# Patient Record
Sex: FEMALE | Race: WHITE | HISPANIC OR LATINO
[De-identification: names, ages, dates, MRNs, and addresses within clinical notes are randomized per-mention and may not be internally consistent; named-entity substitution may affect disease eponyms.]

---

## 2020-08-24 PROBLEM — Z00.00 ENCOUNTER FOR PREVENTIVE HEALTH EXAMINATION: Status: ACTIVE | Noted: 2020-08-24

## 2020-09-23 ENCOUNTER — APPOINTMENT (OUTPATIENT)
Dept: ENDOCRINOLOGY | Facility: CLINIC | Age: 60
End: 2020-09-23
Payer: MEDICAID

## 2020-09-23 VITALS
DIASTOLIC BLOOD PRESSURE: 81 MMHG | HEART RATE: 82 BPM | HEIGHT: 61 IN | WEIGHT: 141 LBS | BODY MASS INDEX: 26.62 KG/M2 | SYSTOLIC BLOOD PRESSURE: 142 MMHG

## 2020-09-23 PROCEDURE — 97802 MEDICAL NUTRITION INDIV IN: CPT

## 2020-09-23 PROCEDURE — 82962 GLUCOSE BLOOD TEST: CPT

## 2020-09-23 PROCEDURE — 83036 HEMOGLOBIN GLYCOSYLATED A1C: CPT | Mod: QW

## 2020-09-23 PROCEDURE — 99205 OFFICE O/P NEW HI 60 MIN: CPT | Mod: 25

## 2020-09-25 ENCOUNTER — APPOINTMENT (OUTPATIENT)
Dept: ENDOCRINOLOGY | Facility: CLINIC | Age: 60
End: 2020-09-25
Payer: MEDICAID

## 2020-09-25 PROCEDURE — 99213 OFFICE O/P EST LOW 20 MIN: CPT | Mod: 95

## 2020-09-25 NOTE — REVIEW OF SYSTEMS
[Blurred Vision] : blurred vision [As Noted in HPI] : as noted in HPI [Joint Pain] : joint pain [Anxiety] : anxiety [Negative] : Heme/Lymph [Polyuria] : no polyuria [Nocturia] : no nocturia [de-identified] : no pins and needles sensation in lower extremities

## 2020-09-25 NOTE — HISTORY OF PRESENT ILLNESS
[FreeTextEntry1] : 59 y/o F pt, with Hx of DM (dx 18 yrs ago at age 42) with no known DM related complications, referred by Dr. Win Dahl, presents today to establish endocrine care with me.\par Other PMHx: RA (has consultation appointment with rheumatologist)\par PSHx:  (~25 yrs ago), Cholecystectomy (~15 yrs ago), Uterus (~10 yrs ago)\par FHx: Thyroid disorder (3 brothers), DM (2 sisters)\par SHx: Non smoker. No EtOH use. \par Lifestyle: Walks QD. Eats 2 meals a day. Checks FBS QD. \par Last Funduscopic visit: 2019\par Menopause in . Pt has one son of age 25. \par NKDA\par \par 2020\par Pt presents today with POCT 309 and POCT A1c 8.2%.   She has been feeling anxious and tends to eat as she likes lately. She checks FBS daily and brought her BS record to the office today.  FBS in late  ranges between 15- 140 with  average in 120. FBS in 2020: , 121, 166, 162, 167, 183, 161, 172, 225. Pt reports experiencing joint discomfort and pain, blurry vision and feeling "unhealthy" when her BS rises. \par Denies osmotic diuresis symptoms and pins and needles sensation in lower extremities. \par \par Current Medications: Metformin 1000 mg BID, Glipizide 10 mg QD, Alogliptin 25 mg QD, Simvastatin 40 mg QD, Fenofibrate 50 mg \par \par Labs: 20: POCT A1c 8.2%

## 2020-09-25 NOTE — END OF VISIT
[FreeTextEntry3] : All medical record entries made by the Scribe were at my, Dr. Chaitanya Garza, direction and personally dictated by me on 09/23/2020. I have reviewed the chart and agree that the record accurately reflects my personal performance of the history, physical exam, assessment and plan. I have also personally directed, reviewed and agreed with the chart.  [Time Spent: ___ minutes] : I have spent [unfilled] minutes of time on the encounter. [>50% of the face to face encounter time was spent on counseling and/or coordination of care for ___] : Greater than 50% of the face to face encounter time was spent on counseling and/or coordination of care for [unfilled]

## 2020-09-25 NOTE — PHYSICAL EXAM
[Alert] : alert [Normal Sclera/Conjunctiva] : normal sclera/conjunctiva [Normal Outer Ear/Nose] : the ears and nose were normal in appearance [No Neck Mass] : no neck mass was observed [No Respiratory Distress] : no respiratory distress [Clear to Auscultation] : lungs were clear to auscultation bilaterally [Normal Rate] : heart rate was normal [Regular Rhythm] : with a regular rhythm [No Edema] : no peripheral edema [Pedal Pulses Normal] : the pedal pulses are present [Normal Bowel Sounds] : normal bowel sounds [Spine Straight] : spine straight [No Stigmata of Cushings Syndrome] : no stigmata of Cushings Syndrome [Normal Gait] : normal gait [Right Foot Was Examined] : right foot ~C was examined [Left Foot Was Examined] : left foot ~C was examined [Normal] : normal [Normal Reflexes] : deep tendon reflexes were 2+ and symmetric [Oriented x3] : oriented to person, place, and time [Acanthosis Nigricans] : no acanthosis nigricans [Vibration Dec.] : normal vibratory sensation at the level of the toes [Diminished Throughout Both Feet] : normal tactile sensation with monofilament testing throughout both feet

## 2020-09-25 NOTE — HISTORY OF PRESENT ILLNESS
[Home] : at home, [unfilled] , at the time of the visit. [Other Location: e.g. Home (Enter Location, City,State)___] : at [unfilled] [Verbal consent obtained from patient] : the patient, [unfilled] [FreeTextEntry1] : 59 y/o F pt, with Hx of DM (dx 18 yrs ago at age 42) with no known DM related complications, referred by Dr. Win Dahl, presents today to establish endocrine care with me.\par Other PMHx: RA (has consultation appointment with rheumatologist)\par PSHx:  (~25 yrs ago), Cholecystectomy (~15 yrs ago), Uterus (~10 yrs ago)\par FHx: Thyroid disorder (3 brothers), DM (2 sisters)\par SHx: Non smoker. No EtOH use. \par Lifestyle: Walks QD. Eats 2 meals a day. Checks FBS QD. \par Last Funduscopic visit: 2019\par Menopause in . Pt has one son of age 25. \par NKDA\par \par 2020\par Pt presents today with POCT 309 and POCT A1c 8.2%.   She has been feeling anxious and tends to eat as she likes lately. She checks FBS daily and brought her BS record to the office today.  FBS in late  ranges between 15- 140 with  average in 120. FBS in 2020: , 121, 166, 162, 167, 183, 161, 172, 225. Pt reports experiencing joint discomfort and pain, blurry vision and feeling "unhealthy" when her BS rises. \par Denies osmotic diuresis symptoms and pins and needles sensation in lower extremities. \par \par 20. Televideo, pat did not have any question prior to this visit\par Seen neurologist for extremities and upper back pain she was Rx Lyrica but has not start yet\par Saw RD, and begun changing her diet. Feels better\par ,and 140. Post prandial 215\par Labs pending\par Current Medications: Metformin 1000 mg BID, Glipizide 10 mg QD, Alogliptin 25 mg QD, Simvastatin 40 mg QD, Fenofibrate 50 mg \par \par Labs: 20: POCT A1c 8.2%

## 2020-09-25 NOTE — ASSESSMENT
[Carbohydrate Consistent Diet] : carbohydrate consistent diet [Importance of Diet and Exercise] : importance of diet and exercise to improve glycemic control, achieve weight loss and improve cardiovascular health [Exercise/Effect on Glucose] : exercise/effect on glucose [Self Monitoring of Blood Glucose] : self monitoring of blood glucose [FreeTextEntry1] : 61 y/o M/F pt with:\par \par 1. Hx of T2DM with no known DM related complications. \par High glucose excursion in the 300's\par Diabetes treatment goals discussed, including target goals for BP, LDL-c, A1c, and body weight. \par Discussed the importance of BS monitoring, and advised pt to monitor pre and post prandial BS.  \par Briefly summarized anti-diabetic medications, including benefits and side effects with pt. \par Recommend pt continue present DM management until the assessment of current evaluation is completed. \par Refer pt to see nurse educator for comprehensive DM teachings, including sick days management. \par Will order labs today, including metabolic and lipid profiles. \par \par Return in: 9/25/20 via telehealth

## 2020-09-25 NOTE — ASSESSMENT
[FreeTextEntry1] : 61 y/o M/F pt with:\par \par 1. Hx of T2DM  diagnosed at age 42 with no known DM related complications. \par High glucose excursion in the 300's, after seen RD postprandial are trending down\par Awaiting for lab report\par Continue DPP IV inh, S.U,and metformin, will review along with labs  report prior to drug treatment recommendation\par \par Return in 8 weeks\par \par \par  [Carbohydrate Consistent Diet] : carbohydrate consistent diet [Importance of Diet and Exercise] : importance of diet and exercise to improve glycemic control, achieve weight loss and improve cardiovascular health [Exercise/Effect on Glucose] : exercise/effect on glucose [Self Monitoring of Blood Glucose] : self monitoring of blood glucose

## 2020-09-25 NOTE — REASON FOR VISIT
[Initial Evaluation] : an initial evaluation [DM Type 2] : DM Type 2 [FreeTextEntry2] : Dr. Win Dahl

## 2020-09-25 NOTE — ADDENDUM
[FreeTextEntry1] : I, Raji Crisostomo, acted solely as a scribe for Dr. Chaitanya Garza on this date. 09/23/2020.

## 2020-09-28 ENCOUNTER — APPOINTMENT (OUTPATIENT)
Dept: ENDOCRINOLOGY | Facility: CLINIC | Age: 60
End: 2020-09-28

## 2020-09-28 LAB
GLUCOSE BLDC GLUCOMTR-MCNC: 309
HBA1C MFR BLD HPLC: 8.2

## 2020-12-03 ENCOUNTER — APPOINTMENT (OUTPATIENT)
Dept: ENDOCRINOLOGY | Facility: CLINIC | Age: 60
End: 2020-12-03
Payer: MEDICAID

## 2020-12-03 ENCOUNTER — APPOINTMENT (OUTPATIENT)
Dept: ENDOCRINOLOGY | Facility: CLINIC | Age: 60
End: 2020-12-03

## 2020-12-03 VITALS
HEART RATE: 71 BPM | WEIGHT: 136 LBS | DIASTOLIC BLOOD PRESSURE: 79 MMHG | BODY MASS INDEX: 25.7 KG/M2 | SYSTOLIC BLOOD PRESSURE: 124 MMHG

## 2020-12-03 PROCEDURE — 99072 ADDL SUPL MATRL&STAF TM PHE: CPT

## 2020-12-03 PROCEDURE — 99214 OFFICE O/P EST MOD 30 MIN: CPT

## 2020-12-04 NOTE — END OF VISIT
[FreeTextEntry3] : All medical record entries made by the Scribe were at my, Dr. Chaitanya Garza, direction and personally dictated by me on 12/03/2020. I have reviewed the chart and agree that the record accurately reflects my personal performance of the history, physical exam, assessment and plan. I have also personally directed, reviewed and agreed with the chart.  [Time Spent: ___ minutes] : I have spent [unfilled] minutes of time on the encounter. [>50% of the face to face encounter time was spent on counseling and/or coordination of care for ___] : Greater than 50% of the face to face encounter time was spent on counseling and/or coordination of care for [unfilled]

## 2020-12-04 NOTE — ADDENDUM
[FreeTextEntry1] : I, Raji Crisostomo, acted solely as a scribe for Dr. Chaitanya Garza on this date. 12/03/2020.

## 2020-12-04 NOTE — HISTORY OF PRESENT ILLNESS
[Home] : at home, [unfilled] , at the time of the visit. [Medical Office: ([facility])___] : at the medical office located in  [Verbal consent obtained from patient] : the patient, [unfilled] [FreeTextEntry1] : 61 y/o F pt, with Hx of DM (dx at age 42) with no known DM related complications. \par Other PMHx: RA (has consultation appointment with rheumatologist)\par PSHx:  (~25 yrs ago), Cholecystectomy (~15 yrs ago), Uterus (~10 yrs ago)\par FHx: Thyroid disorder (3 brothers), DM (2 sisters)\par Lifestyle: Walks QD. Eats 2 meals a day. Checks FBS QD. \par Last Funduscopic visit: 2019\par Menopause in . Pt has one son of age 25. \par \par 2020 \par Pt presents today for DM f/u, feeling well with no acute complaints. She has lost 5 lbs in last 3 months. Pt brought her BS record. FBS in 10/2020: 156, 150, 140. She is trying to get better with her diet and lifestyle. \par \par Current Medications: Metformin 1000 mg BID, Glipizide 10 mg QD, Alogliptin 25 mg QD, Pioglitazone, Simvastatin 40 mg QD, Fenofibrate 50 mg \par \par Labs: \par - 20: Glucose 308, s.creat 0.9, ALT 49, LDL-c 90, , Alb/Creat nml, TSH 4.41, \par - 20: POCT A1c 8.2%

## 2020-12-04 NOTE — ASSESSMENT
[FreeTextEntry1] : 61 y/o M/F pt with:\par \par 1. Hx of T2DM (dx at age 42) with no known DM related complications: \par She is currently on 4 medication for DM control and it is barely accomplished. Recent A1c 8.2% on 9/2020. \par Pt is at increased risk for DM complications, ASCVD and cardiac events. \par She has begun diet and lifestyle modifications. \par Her BS pattern shows the need to initiate basal insulin. \par Will initiate pt on Tresiba 13 u QPM and Repaglinide 2 mg ac. Hold Metformin, Alogliptin, Pioglitazone and Glipizide. \par Pt will see RD later today. \par \par Return in: 1 month\par \par \par  [Carbohydrate Consistent Diet] : carbohydrate consistent diet [Importance of Diet and Exercise] : importance of diet and exercise to improve glycemic control, achieve weight loss and improve cardiovascular health [Exercise/Effect on Glucose] : exercise/effect on glucose [Self Monitoring of Blood Glucose] : self monitoring of blood glucose

## 2020-12-09 ENCOUNTER — APPOINTMENT (OUTPATIENT)
Dept: ENDOCRINOLOGY | Facility: CLINIC | Age: 60
End: 2020-12-09
Payer: MEDICAID

## 2020-12-09 PROCEDURE — 99072 ADDL SUPL MATRL&STAF TM PHE: CPT

## 2020-12-09 PROCEDURE — 99211 OFF/OP EST MAY X REQ PHY/QHP: CPT

## 2020-12-09 RX ORDER — BLOOD SUGAR DIAGNOSTIC
STRIP MISCELLANEOUS
Qty: 300 | Refills: 3 | Status: ACTIVE | COMMUNITY
Start: 2020-12-09 | End: 1900-01-01

## 2020-12-09 RX ORDER — LANCETS
EACH MISCELLANEOUS
Qty: 300 | Refills: 3 | Status: ACTIVE | COMMUNITY
Start: 2020-12-09 | End: 1900-01-01

## 2020-12-29 ENCOUNTER — RX CHANGE (OUTPATIENT)
Age: 60
End: 2020-12-29

## 2020-12-29 RX ORDER — REPAGLINIDE 2 MG/1
2 TABLET ORAL 3 TIMES DAILY
Qty: 90 | Refills: 11 | Status: DISCONTINUED | COMMUNITY
Start: 2020-12-03 | End: 2020-12-29

## 2021-01-27 ENCOUNTER — APPOINTMENT (OUTPATIENT)
Dept: ENDOCRINOLOGY | Facility: CLINIC | Age: 61
End: 2021-01-27
Payer: MEDICAID

## 2021-01-27 VITALS
SYSTOLIC BLOOD PRESSURE: 140 MMHG | BODY MASS INDEX: 27.38 KG/M2 | HEART RATE: 73 BPM | HEIGHT: 61 IN | DIASTOLIC BLOOD PRESSURE: 85 MMHG | WEIGHT: 145 LBS

## 2021-01-27 PROCEDURE — 99214 OFFICE O/P EST MOD 30 MIN: CPT | Mod: 25

## 2021-01-27 PROCEDURE — 82962 GLUCOSE BLOOD TEST: CPT

## 2021-01-27 PROCEDURE — 99072 ADDL SUPL MATRL&STAF TM PHE: CPT

## 2021-01-28 NOTE — ADDENDUM
[FreeTextEntry1] : I, Raji Crisostomo, acted solely as a scribe for Dr. Chaitanya Garza on this date. 01/27/2021.

## 2021-01-28 NOTE — PHYSICAL EXAM
[Alert] : alert [Normal Outer Ear/Nose] : the ears and nose were normal in appearance [No Neck Mass] : no neck mass was observed [No Respiratory Distress] : no respiratory distress [Clear to Auscultation] : lungs were clear to auscultation bilaterally [Regular Rhythm] : with a regular rhythm [Normal Rate] : heart rate was normal [No Edema] : no peripheral edema [Pedal Pulses Normal] : the pedal pulses are present [Normal Bowel Sounds] : normal bowel sounds [Spine Straight] : spine straight [No Stigmata of Cushings Syndrome] : no stigmata of Cushings Syndrome [Normal Gait] : normal gait [Right Foot Was Examined] : right foot ~C was examined [Left Foot Was Examined] : left foot ~C was examined [Normal] : normal [Normal Reflexes] : deep tendon reflexes were 2+ and symmetric [Oriented x3] : oriented to person, place, and time [Acanthosis Nigricans] : no acanthosis nigricans [Vibration Dec.] : normal vibratory sensation at the level of the toes [Diminished Throughout Both Feet] : normal tactile sensation with monofilament testing throughout both feet [de-identified] : cataracts

## 2021-01-28 NOTE — HISTORY OF PRESENT ILLNESS
[FreeTextEntry1] : 61 y/o F pt, with Hx of DM (dx at age 42) with no known DM related complications, and Hx of subclinical hypothyroidism. \par Other PMHx: RA (has consultation appointment with rheumatologist)\par PSHx:  (~25 yrs ago), Cholecystectomy (~15 yrs ago), Uterus (~10 yrs ago)\par FHx: Thyroid disorder (3 brothers), DM (2 sisters)\par Lifestyle: Walks QD. Eats 2 meals a day. Checks FBS QD. \par Last Funduscopic visit: 2019\par Menopause in . Pt has one son of age 25. \par \par 2020 \par Pt presents today for DM f/u, feeling well with no acute complaints. She has lost 5 lbs in last 3 months. Pt brought her BS record. FBS in 10/2020: 156, 150, 140. She is trying to get better with her diet and lifestyle. \par \par 2021 \par - Review of labs: increased TSH of 4.41 on 20. POCT A1c of 8.2% on 20. \par \par Pt presents today with POCT 96, /85 and BMI 27.4 for DM f/u, feeling well with no major physical complaints. \par Pt states that she is focused on her DM management; she has been monitoring her diet and walking. She has gained 9 lbs in last 1.5 months. \par Pt brought her BS record. FBS: 100, 95, 69, 133, 91, 101, 120, 115, 104. Postprandial BS: 273, 144, 260, 205, 182, 124, 156, 108, 181, 163, 142\par \par Current Medications: Tresiba 13 u qpm (initiated last visit 2020), Repaglinide 2 mg ac (initiated last visit 2020), Simvastatin 40 mg QD, Fenofibrate 50 mg, Lyrica\par Metformin (held last visit 2020), Alogliptin (held last visit 2020), Pioglitazone (held last visit 2020), Glipizide (held last visit 2020)\par \par Labs: \par - 20: Glucose 308, s.creat 0.9, ALT 49, LDL-c 90, , Alb/Creat nml, TSH 4.41\par - 20: POCT A1c 8.2%

## 2021-01-28 NOTE — ASSESSMENT
[FreeTextEntry1] : 59 y/o M/F pt with:\par \par 1. T2DM (dx almost 20 yrs ago) with no known DM related complications or CAD: \par Her DM regimen was modified in 12/2020 resulting in pre and postprandial BS in target over 90% of the time. \par She has not modified her lifestyle and diet resulting in weight gain of 10 lbs. \par No known DM related complications. No Hx of CAD.  BP is 140/85, BMI 27 and LDL-c 90.\par 10 yrs ASCVD risk: 13.69%. LDL-c target of 70. Will switch Simvastatin to Crestor- moderate intensity. \par Discontinue Fenofibrate. \par \par Return in: 3 months\par \par \par

## 2021-01-28 NOTE — END OF VISIT
[FreeTextEntry3] : All medical record entries made by the Scribe were at my, Dr. Chaitanya Garza, direction and personally dictated by me on 01/27/2021. I have reviewed the chart and agree that the record accurately reflects my personal performance of the history, physical exam, assessment and plan. I have also personally directed, reviewed and agreed with the chart.  [Time Spent: ___ minutes] : I have spent [unfilled] minutes of time on the encounter.

## 2021-02-04 LAB
ALBUMIN SERPL ELPH-MCNC: 4.6 G/DL
ALP BLD-CCNC: 58 U/L
ALT SERPL-CCNC: 28 U/L
ANION GAP SERPL CALC-SCNC: 15 MMOL/L
AST SERPL-CCNC: 21 U/L
BILIRUB SERPL-MCNC: 0.3 MG/DL
BUN SERPL-MCNC: 17 MG/DL
CALCIUM SERPL-MCNC: 9.9 MG/DL
CHLORIDE SERPL-SCNC: 103 MMOL/L
CHOLEST SERPL-MCNC: 192 MG/DL
CO2 SERPL-SCNC: 24 MMOL/L
CREAT SERPL-MCNC: 0.68 MG/DL
CREAT SPEC-SCNC: 77 MG/DL
ESTIMATED AVERAGE GLUCOSE: 171 MG/DL
GLUCOSE BLDC GLUCOMTR-MCNC: 96
GLUCOSE SERPL-MCNC: 75 MG/DL
HBA1C MFR BLD HPLC: 7.6 %
HDLC SERPL-MCNC: 50 MG/DL
LDLC SERPL CALC-MCNC: 98 MG/DL
MICROALBUMIN 24H UR DL<=1MG/L-MCNC: 6.3 MG/DL
MICROALBUMIN/CREAT 24H UR-RTO: 81 MG/G
NONHDLC SERPL-MCNC: 142 MG/DL
POTASSIUM SERPL-SCNC: 4.7 MMOL/L
PROT SERPL-MCNC: 7.3 G/DL
SODIUM SERPL-SCNC: 142 MMOL/L
T4 FREE SERPL-MCNC: 0.9 NG/DL
TRIGL SERPL-MCNC: 218 MG/DL
TSH SERPL-ACNC: 3.36 UIU/ML

## 2021-02-10 ENCOUNTER — APPOINTMENT (OUTPATIENT)
Dept: ENDOCRINOLOGY | Facility: CLINIC | Age: 61
End: 2021-02-10
Payer: MEDICAID

## 2021-02-10 ENCOUNTER — APPOINTMENT (OUTPATIENT)
Dept: ENDOCRINOLOGY | Facility: CLINIC | Age: 61
End: 2021-02-10

## 2021-02-10 PROCEDURE — 99072 ADDL SUPL MATRL&STAF TM PHE: CPT

## 2021-02-10 PROCEDURE — 99211 OFF/OP EST MAY X REQ PHY/QHP: CPT

## 2021-02-10 RX ORDER — PEN NEEDLE, DIABETIC 29 G X1/2"
32G X 4 MM NEEDLE, DISPOSABLE MISCELLANEOUS
Qty: 20 | Refills: 4 | Status: ACTIVE | COMMUNITY
Start: 2021-02-10 | End: 1900-01-01

## 2021-03-10 ENCOUNTER — APPOINTMENT (OUTPATIENT)
Dept: ENDOCRINOLOGY | Facility: CLINIC | Age: 61
End: 2021-03-10
Payer: MEDICAID

## 2021-03-10 VITALS
WEIGHT: 141 LBS | DIASTOLIC BLOOD PRESSURE: 87 MMHG | SYSTOLIC BLOOD PRESSURE: 135 MMHG | BODY MASS INDEX: 26.64 KG/M2 | HEART RATE: 77 BPM

## 2021-03-10 PROCEDURE — 99211 OFF/OP EST MAY X REQ PHY/QHP: CPT

## 2021-03-10 PROCEDURE — 99072 ADDL SUPL MATRL&STAF TM PHE: CPT

## 2021-04-26 ENCOUNTER — APPOINTMENT (OUTPATIENT)
Dept: ENDOCRINOLOGY | Facility: CLINIC | Age: 61
End: 2021-04-26
Payer: MEDICAID

## 2021-04-26 VITALS
HEART RATE: 81 BPM | SYSTOLIC BLOOD PRESSURE: 118 MMHG | BODY MASS INDEX: 26.45 KG/M2 | WEIGHT: 140 LBS | DIASTOLIC BLOOD PRESSURE: 72 MMHG

## 2021-04-26 PROCEDURE — 99213 OFFICE O/P EST LOW 20 MIN: CPT | Mod: 25

## 2021-04-26 PROCEDURE — 82962 GLUCOSE BLOOD TEST: CPT

## 2021-04-26 PROCEDURE — 99072 ADDL SUPL MATRL&STAF TM PHE: CPT

## 2021-04-29 NOTE — PHYSICAL EXAM
[Alert] : alert [Normal Outer Ear/Nose] : the ears and nose were normal in appearance [No Neck Mass] : no neck mass was observed [No Respiratory Distress] : no respiratory distress [Clear to Auscultation] : lungs were clear to auscultation bilaterally [Normal Rate] : heart rate was normal [Regular Rhythm] : with a regular rhythm [No Edema] : no peripheral edema [Pedal Pulses Normal] : the pedal pulses are present [Normal Bowel Sounds] : normal bowel sounds [Spine Straight] : spine straight [No Stigmata of Cushings Syndrome] : no stigmata of Cushings Syndrome [Normal Gait] : normal gait [Normal Reflexes] : deep tendon reflexes were 2+ and symmetric [Oriented x3] : oriented to person, place, and time [Acanthosis Nigricans] : no acanthosis nigricans [de-identified] : Cataracts.

## 2021-04-29 NOTE — END OF VISIT
[FreeTextEntry2] : All medical records entries made by the Scribe were at my Dr. Chaitanya Garza direction and personally dictated by me on 04/26/2021. I have reviewed the chart and agree that the record accurately reflects my personal performance of the history, physical exam, assessment and plan. I have also personally directed, reviewed and agreed with the chart.  [Time Spent: ___ minutes] : I have spent [unfilled] minutes of time on the encounter.

## 2021-04-29 NOTE — HISTORY OF PRESENT ILLNESS
[FreeTextEntry1] : 61 y/o F pt, with Hx of DM (dx at age 42) with no known DM related complications, and Hx of subclinical hypothyroidism. \par Other PMHx: RA (has consultation appointment with rheumatologist)\par PSHx:  (~25 yrs ago), Cholecystectomy (~15 yrs ago), Uterus (~10 yrs ago)\par FHx: Thyroid disorder (3 brothers), DM (2 sisters)\par Lifestyle: Walks QD. Eats 2 meals a day. Checks FBS QD. \par Last Funduscopic visit: 2019\par Menopause in . Pt has one son of age 25. \par \par 2020 \par Pt presents today for DM f/u, feeling well with no acute complaints. She has lost 5 lbs in last 3 months. Pt brought her BS record. FBS in 10/2020: 156, 150, 140. She is trying to get better with her diet and lifestyle. \par \par 2021 \par - Review of labs: increased TSH of 4.41 on 20. POCT A1c of 8.2% on 20. \par \par Pt presents today with POCT 96, /85 and BMI 27.4 for DM f/u, feeling well with no major physical complaints. \par Pt states that she is focused on her DM management; she has been monitoring her diet and walking. She has gained 9 lbs in last 1.5 months. \par Pt brought her BS record. FBS: 100, 95, 69, 133, 91, 101, 120, 115, 104. Postprandial BS: 273, 144, 260, 205, 182, 124, 156, 108, 181, 163, 142\par \par 2021\par 55 years old female with history of type 2 diabetes diagnosed at age 42. No known diagnosed related complications. Recently went to see her internist for back pain and she was prescribed steroids the resulted on elevation of pre and postprandial glucose\par She have blood drawn 2 weeks ago and patient was informed that it was his 6.3% and triglyceride levels remained high and\par She is feeling well with noncompliance except back pain. Post steroids :  Fastin,292,152, 192, 122 post prandial 265,279, 269, . \par FBS before the steroid injections were 118,101,129, and 121 postprandials before the steroid injections were  79,177,161,150 after the injections her  FBS were 169,292,152,192, and 122 postprandials  265,279, 269, 128 \par \par Current Medications: Tresiba 13 u qpm (initiated last visit 2020), Repaglinide 2 mg ac (initiated last visit 2020), Simvastatin 40 mg QD, Fenofibrate 50 mg, Lyrica, ozempic once a week, lisinopril 5 mg\par Metformin 1000 mg bid, \par \par \par Labs: \par -21: serum creatinine 0.57, triglycerides 289,Calcium 9.3, TSH 3.1, A1c 6.3% and uric acid 4.8 \par - 21: Microalbumin/creatinine ratio 81, LDL 98, Triglyceride 218, HDL 50, Non-, Glucose 75, Free T4 0.9, TSH 3.36, A1c 7.6%, \par - 20: Glucose 308, s.creat 0.9, ALT 49, LDL-c 90, , Alb/Creat nml, TSH 4.41\par - 20: POCT A1c 8.2%

## 2021-04-29 NOTE — ADDENDUM
[FreeTextEntry1] : I, Annelise Fink, acted solely as a scribe for Dr. Chaitanya Garza on this date. 04/26/2021.

## 2021-04-29 NOTE — ASSESSMENT
[FreeTextEntry1] : 59 y/o M/F pt with:\par \par 1. T2DM (dx almost 20 yrs ago) with no known DM related complications or CAD: \par Recent A1c 6.3%\par She is feeling well except chronic back pain for which she was RX steroid injections\par She has taken multiple medicine to control her blood sugar, I think we can decrease the number of medicines.\par Encouraged patient to improve her life style and diet in order to decrease IR\par Continue GLP1, Tresiba,,metformin, and Prandin\par \par \par \par \par \par \par \par Return in 4 months\par \par \par \par \par  [Carbohydrate Consistent Diet] : carbohydrate consistent diet [Importance of Diet and Exercise] : importance of diet and exercise to improve glycemic control, achieve weight loss and improve cardiovascular health [Self Monitoring of Blood Glucose] : self monitoring of blood glucose

## 2021-05-26 ENCOUNTER — APPOINTMENT (OUTPATIENT)
Dept: ENDOCRINOLOGY | Facility: CLINIC | Age: 61
End: 2021-05-26

## 2021-08-30 ENCOUNTER — APPOINTMENT (OUTPATIENT)
Dept: ENDOCRINOLOGY | Facility: CLINIC | Age: 61
End: 2021-08-30
Payer: MEDICAID

## 2021-08-30 VITALS
WEIGHT: 133 LBS | HEIGHT: 61 IN | DIASTOLIC BLOOD PRESSURE: 70 MMHG | SYSTOLIC BLOOD PRESSURE: 108 MMHG | HEART RATE: 93 BPM | BODY MASS INDEX: 25.11 KG/M2

## 2021-08-30 PROCEDURE — 99214 OFFICE O/P EST MOD 30 MIN: CPT

## 2021-08-31 ENCOUNTER — NON-APPOINTMENT (OUTPATIENT)
Age: 61
End: 2021-08-31

## 2021-08-31 LAB — GLUCOSE BLDC GLUCOMTR-MCNC: 205

## 2021-08-31 RX ORDER — FENOFIBRATE 54 MG/1
54 TABLET ORAL
Qty: 1 | Refills: 3 | Status: DISCONTINUED | COMMUNITY
Start: 2021-08-30 | End: 2021-08-31

## 2021-09-03 NOTE — HISTORY OF PRESENT ILLNESS
[FreeTextEntry1] : 62 y/o F pt, with Hx of DM (dx at age 42) with no known DM related complications, and Hx of subclinical hypothyroidism. \par Other PMHx: RA (has consultation appointment with rheumatologist)\par PSHx:  (~25 yrs ago), Cholecystectomy (~15 yrs ago), Uterus (~10 yrs ago)\par FHx: Thyroid disorder (3 brothers), DM (2 sisters)\par Lifestyle: Walks QD. Eats 2 meals a day. Checks FBS QD. \par Last Funduscopic visit: 2019\par Menopause in . Pt has one son of age 25. \par \par 2020 \par Pt presents today for DM f/u, feeling well with no acute complaints. She has lost 5 lbs in last 3 months. Pt brought her BS record. FBS in 10/2020: 156, 150, 140. She is trying to get better with her diet and lifestyle. \par \par 2021 \par - Review of labs: increased TSH of 4.41 on 20. POCT A1c of 8.2% on 20. \par \par Pt presents today with POCT 96, /85 and BMI 27.4 for DM f/u, feeling well with no major physical complaints. \par Pt states that she is focused on her DM management; she has been monitoring her diet and walking. She has gained 9 lbs in last 1.5 months. \par Pt brought her BS record. FBS: 100, 95, 69, 133, 91, 101, 120, 115, 104. Postprandial BS: 273, 144, 260, 205, 182, 124, 156, 108, 181, 163, 142\par \par 2021\par 55 years old female with history of type 2 diabetes diagnosed at age 42. No known diagnosed related complications. Recently went to see her internist for back pain and she was prescribed steroids the resulted on elevation of pre and postprandial glucose\par She have blood drawn 2 weeks ago and patient was informed that it was his 6.3% and triglyceride levels remained high and\par She is feeling well with noncompliance except back pain. Post steroids :  Fastin,292,152, 192, 122 post prandial 265,279, 269, . \par FBS before the steroid injections were 118,101,129, and 121 postprandials before the steroid injections were  79,177,161,150 after the injections her  FBS were 169,292,152,192, and 122 postprandials  265,279, 269, 128 \par \par 21\par Pt was dx with hip arthrosis. Pt is not taking Fenofibrate. FBS: 108, 148, 171, 123, 180, 280, 180, 172.\par \par Current Medications: Tresiba 13 u qpm (initiated last visit 2020), Repaglinide 2 mg ac (initiated last visit 2020), Simvastatin 40 mg QD, Fenofibrate 50 mg, Lyrica, ozempic once a week, lisinopril 5 mg, Metformin 1000 mg bid\par \par Labs: \par -21: serum creatinine 0.57, triglycerides 289,Calcium 9.3, TSH 3.1, A1c 6.3% and uric acid 4.8 \par - 21: Microalbumin/creatinine ratio 81, LDL 98, Triglyceride 218, HDL 50, Non-, Glucose 75, Free T4 0.9, TSH 3.36, A1c 7.6%, \par - 20: Glucose 308, s.creat 0.9, ALT 49, LDL-c 90, , Alb/Creat nml, TSH 4.41\par - 20: POCT A1c 8.2%

## 2021-09-03 NOTE — PHYSICAL EXAM
[Alert] : alert [Normal Outer Ear/Nose] : the ears and nose were normal in appearance [No Neck Mass] : no neck mass was observed [No Respiratory Distress] : no respiratory distress [Clear to Auscultation] : lungs were clear to auscultation bilaterally [Normal Rate] : heart rate was normal [Regular Rhythm] : with a regular rhythm [No Edema] : no peripheral edema [Pedal Pulses Normal] : the pedal pulses are present [Normal Bowel Sounds] : normal bowel sounds [Spine Straight] : spine straight [No Stigmata of Cushings Syndrome] : no stigmata of Cushings Syndrome [Normal Gait] : normal gait [Normal Reflexes] : deep tendon reflexes were 2+ and symmetric [Oriented x3] : oriented to person, place, and time [Acanthosis Nigricans] : no acanthosis nigricans [de-identified] : Cataracts.

## 2021-09-03 NOTE — ASSESSMENT
[Carbohydrate Consistent Diet] : carbohydrate consistent diet [Importance of Diet and Exercise] : importance of diet and exercise to improve glycemic control, achieve weight loss and improve cardiovascular health [Self Monitoring of Blood Glucose] : self monitoring of blood glucose [FreeTextEntry1] : 61 y/o M/F pt with:\par \par 1. T2DM (dx almost 20 yrs ago) with no known DM related complications or CAD: \par In control Fasting glucose in 170s. She prefers not to modify her diabetes regimen unless high BS continues after her stress subsides.\par She is taking multiple anti-diabetic medicines and she continues poorly controlled. \par Fenofibrate was sent to pharmacy this morning.\par Continue on moderate intensity of statins. In addition, will start Fenofibrate (triglycerides 289 in 04/2021)\par \par Return in 2 months.\par \par \par \par \par

## 2022-01-04 ENCOUNTER — APPOINTMENT (OUTPATIENT)
Dept: ENDOCRINOLOGY | Facility: CLINIC | Age: 62
End: 2022-01-04
Payer: MEDICAID

## 2022-01-04 VITALS
HEIGHT: 61 IN | SYSTOLIC BLOOD PRESSURE: 129 MMHG | BODY MASS INDEX: 26.06 KG/M2 | HEART RATE: 78 BPM | DIASTOLIC BLOOD PRESSURE: 74 MMHG | WEIGHT: 138 LBS

## 2022-01-04 DIAGNOSIS — E78.00 PURE HYPERCHOLESTEROLEMIA, UNSPECIFIED: ICD-10-CM

## 2022-01-04 DIAGNOSIS — E11.65 TYPE 2 DIABETES MELLITUS WITH HYPERGLYCEMIA: ICD-10-CM

## 2022-01-04 PROCEDURE — 82962 GLUCOSE BLOOD TEST: CPT

## 2022-01-04 PROCEDURE — 99213 OFFICE O/P EST LOW 20 MIN: CPT | Mod: 25

## 2022-01-07 PROBLEM — E78.00 HYPERCHOLESTEREMIA: Status: ACTIVE | Noted: 2021-01-27

## 2022-01-07 PROBLEM — E11.65 UNCONTROLLED TYPE 2 DIABETES MELLITUS: Status: RESOLVED | Noted: 2020-09-23 | Resolved: 2022-01-07

## 2022-01-07 NOTE — END OF VISIT
[FreeTextEntry3] : All medical record entries made by the Scribe were at my, Dr. Chaitanya Garza, direction and personally dictated by me on 01/04/2022. I have reviewed the chart and agree that the record accurately reflects my personal performance of the history, physical exam, assessment and plan. I have also personally directed, reviewed and agreed with the chart.

## 2022-01-07 NOTE — PHYSICAL EXAM
[Alert] : alert [Normal Outer Ear/Nose] : the ears and nose were normal in appearance [No Neck Mass] : no neck mass was observed [No Respiratory Distress] : no respiratory distress [Clear to Auscultation] : lungs were clear to auscultation bilaterally [Normal Rate] : heart rate was normal [Regular Rhythm] : with a regular rhythm [No Edema] : no peripheral edema [Pedal Pulses Normal] : the pedal pulses are present [Normal Bowel Sounds] : normal bowel sounds [Spine Straight] : spine straight [No Stigmata of Cushings Syndrome] : no stigmata of Cushings Syndrome [Normal Gait] : normal gait [Normal Reflexes] : deep tendon reflexes were 2+ and symmetric [Oriented x3] : oriented to person, place, and time [Acanthosis Nigricans] : no acanthosis nigricans [de-identified] : Cataracts.

## 2022-01-07 NOTE — HISTORY OF PRESENT ILLNESS
[FreeTextEntry1] : 62 y/o F pt, with Hx of DM (dx at age 42) with no known DM related complications, and Hx of subclinical hypothyroidism. \par Other PMHx: RA (has consultation appointment with rheumatologist)\par PSHx:  (~25 yrs ago), Cholecystectomy (~15 yrs ago), Uterus (~10 yrs ago)\par FHx: Thyroid disorder (3 brothers), DM (2 sisters)\par Lifestyle: Walks QD. Eats 2 meals a day. Checks FBS QD. \par Last Funduscopic visit: 2019\par Menopause in . Pt has one son of age 25. \par \par 2020 \par Pt presents today for DM f/u, feeling well with no acute complaints. She has lost 5 lbs in last 3 months. Pt brought her BS record. FBS in 10/2020: 156, 150, 140. She is trying to get better with her diet and lifestyle. \par \par 2021 \par - Review of labs: increased TSH of 4.41 on 20. POCT A1c of 8.2% on 20. \par \par Pt presents today with POCT 96, /85 and BMI 27.4 for DM f/u, feeling well with no major physical complaints. \par Pt states that she is focused on her DM management; she has been monitoring her diet and walking. She has gained 9 lbs in last 1.5 months. \par Pt brought her BS record. FBS: 100, 95, 69, 133, 91, 101, 120, 115, 104. Postprandial BS: 273, 144, 260, 205, 182, 124, 156, 108, 181, 163, 142\par \par 2021\par 55 years old female with history of type 2 diabetes diagnosed at age 42. No known diagnosed related complications. Recently went to see her internist for back pain and she was prescribed steroids the resulted on elevation of pre and postprandial glucose\par She have blood drawn 2 weeks ago and patient was informed that it was his 6.3% and triglyceride levels remained high and\par She is feeling well with noncompliance except back pain. Post steroids :  Fastin,292,152, 192, 122 post prandial 265,279, 269, . \par FBS before the steroid injections were 118,101,129, and 121 postprandials before the steroid injections were  79,177,161,150 after the injections her  FBS were 169,292,152,192, and 122 postprandials  265,279, 269, 128 \par \par 2022\par Pt presents today with /74, BMI 26.08, and POCT 175, feeling well and reports no major issues regarding BS levels. Once or twice a week she will have episodes of hypoglycemia. Pt is consistent with Tresiba 13u, Ozempic qw, and Metformin. She recently completed her yearly physical and most recent A1c is 5.2% as per pt. In 10/2021, she was in the hospital for L hip surgery. \par COVID 2nd dose: 2021. Pt will schedule booster dose soon. \par Pt did not receive flu shot.  \par  \par Current Medications: Tresiba 13 u qpm (initiated 2020), Repaglinide 2 mg ac (initiated last visit 2020), Simvastatin 40 mg QD, Fenofibrate 50 mg, Lyrica, ozempic once a week, lisinopril 5 mg\par Metformin 1000 mg bid, \par \par Labs: \par -21: serum creatinine 0.57, triglycerides 289,Calcium 9.3, TSH 3.1, A1c 6.3% and uric acid 4.8 \par - 21: Microalbumin/creatinine ratio 81, LDL 98, Triglyceride 218, HDL 50, Non-, Glucose 75, Free T4 0.9, TSH 3.36, A1c 7.6%, \par - 20: Glucose 308, s.creat 0.9, ALT 49, LDL-c 90, , Alb/Creat nml, TSH 4.41\par - 20: POCT A1c 8.2%

## 2022-01-07 NOTE — ADDENDUM
[FreeTextEntry1] : I, Katia Smith, acted solely as a scribe for Dr. Chaitanya Garza on this date. 01/04/2022.

## 2022-01-07 NOTE — ASSESSMENT
[Carbohydrate Consistent Diet] : carbohydrate consistent diet [Importance of Diet and Exercise] : importance of diet and exercise to improve glycemic control, achieve weight loss and improve cardiovascular health [Self Monitoring of Blood Glucose] : self monitoring of blood glucose [FreeTextEntry1] : 61 y/o M/F pt with:\par \par 1. T2DM (dx almost 20 yrs ago) with no known DM related complications or CAD: \par Pt underwent L hip surgery in 10/2021 and she is doing well. \par She remains focused with her lifestyle and diet. As a result, most recent A1c is 5.2% as per pt. \par Recommend to hold Ozempic, and continue with Metformin and Repaglinide. \par \par 2. Hypertriglyceridemia\par Associated with uncontrolled DM and excessive consumption of carbohydrates. \par Pt has already improved her lifestyle and diet.\par For the time being, she is to continue with Fenofibrate. \par \par \par Return in 4 months\par \par \par \par \par

## 2022-04-12 LAB — GLUCOSE BLDC GLUCOMTR-MCNC: 175

## 2022-06-16 ENCOUNTER — APPOINTMENT (OUTPATIENT)
Dept: NEUROSURGERY | Facility: CLINIC | Age: 62
End: 2022-06-16
Payer: MEDICAID

## 2022-06-16 VITALS
HEIGHT: 61 IN | WEIGHT: 137 LBS | OXYGEN SATURATION: 97 % | HEART RATE: 80 BPM | DIASTOLIC BLOOD PRESSURE: 89 MMHG | SYSTOLIC BLOOD PRESSURE: 129 MMHG | BODY MASS INDEX: 25.86 KG/M2

## 2022-06-16 DIAGNOSIS — M51.26 OTHER INTERVERTEBRAL DISC DISPLACEMENT, LUMBAR REGION: ICD-10-CM

## 2022-06-16 DIAGNOSIS — M54.16 RADICULOPATHY, LUMBAR REGION: ICD-10-CM

## 2022-06-16 PROCEDURE — 99203 OFFICE O/P NEW LOW 30 MIN: CPT

## 2022-06-16 RX ORDER — SIMVASTATIN 40 MG/1
40 TABLET, FILM COATED ORAL
Qty: 30 | Refills: 0 | Status: ACTIVE | COMMUNITY
Start: 2021-09-08

## 2022-06-16 RX ORDER — METFORMIN HYDROCHLORIDE 1000 MG/1
1000 TABLET, COATED ORAL
Qty: 60 | Refills: 0 | Status: ACTIVE | COMMUNITY
Start: 2021-09-08

## 2022-06-16 RX ORDER — ASPIRIN 81 MG/1
81 TABLET, CHEWABLE ORAL
Qty: 30 | Refills: 0 | Status: ACTIVE | COMMUNITY
Start: 2022-05-10

## 2022-06-16 RX ORDER — OMEPRAZOLE 40 MG/1
40 CAPSULE, DELAYED RELEASE ORAL
Qty: 30 | Refills: 0 | Status: ACTIVE | COMMUNITY
Start: 2022-02-17

## 2022-06-16 RX ORDER — PREGABALIN 100 MG/1
100 CAPSULE ORAL
Qty: 60 | Refills: 0 | Status: ACTIVE | COMMUNITY
Start: 2021-09-23

## 2022-06-17 NOTE — ASSESSMENT
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 MRI lumbar spine reviewed with pt in detail. Will obtain xray lumbar spine with flex/extension to r/o isntability. Continue with PT and pain management in the meantime.

## 2022-06-17 NOTE — HISTORY OF PRESENT ILLNESS
[FreeTextEntry1] : low back pain  [de-identified] : The patient is a 61 y/o, female, with a hx of DM II, HLD, LTHR by  2021, and chronic low back pain. Since undergoing L hip replacement she has been experiencing low back pain radiating into posterior buttocks,L groin/hip, and b/l hamstrings. She rates the pain a 7/10. She reports her knees lock occasionally when walking. Denies any weakness, or incontinence. She has numbness of feet, however has diabetic neuropathy and has been on pregabalin. She has been attending physical therapy for 1 year for her lumbar spine. She has been seen by  pain management for blocks, and RFA. MRI lumbar spine 12/21.

## 2022-06-17 NOTE — REVIEW OF SYSTEMS
[As Noted in HPI] : as noted in HPI [Numbness] : numbness [Negative] : Heme/Lymph [de-identified] : low back pain

## 2022-06-17 NOTE — PHYSICAL EXAM
[General Appearance - Alert] : alert [General Appearance - In No Acute Distress] : in no acute distress [Oriented To Time, Place, And Person] : oriented to person, place, and time [Impaired Insight] : insight and judgment were intact [Affect] : the affect was normal [Normal] : normal [4] : 4/5 Great Toe Extension (L5) [5] : 5/5 Ankle Plantar Flexion (S1) [Straight-Leg Raise Test - Left] : straight leg raise of the left leg was negative [Straight-Leg Raise Test - Right] : straight leg raise  of the right leg was negative [No Deficits] : no sensory deficits

## 2022-06-23 ENCOUNTER — APPOINTMENT (OUTPATIENT)
Dept: ENDOCRINOLOGY | Facility: CLINIC | Age: 62
End: 2022-06-23
Payer: MEDICAID

## 2022-06-23 VITALS
WEIGHT: 137 LBS | SYSTOLIC BLOOD PRESSURE: 129 MMHG | BODY MASS INDEX: 25.89 KG/M2 | HEART RATE: 87 BPM | DIASTOLIC BLOOD PRESSURE: 86 MMHG

## 2022-06-23 DIAGNOSIS — E11.65 TYPE 2 DIABETES MELLITUS WITH HYPERGLYCEMIA: ICD-10-CM

## 2022-06-23 LAB — GLUCOSE BLDC GLUCOMTR-MCNC: 245

## 2022-06-23 PROCEDURE — 99213 OFFICE O/P EST LOW 20 MIN: CPT | Mod: 25

## 2022-06-23 PROCEDURE — 82962 GLUCOSE BLOOD TEST: CPT

## 2022-06-23 RX ORDER — INSULIN DEGLUDEC INJECTION 100 U/ML
100 INJECTION, SOLUTION SUBCUTANEOUS
Qty: 1 | Refills: 1 | Status: DISCONTINUED | COMMUNITY
Start: 2020-12-03 | End: 2022-06-23

## 2022-06-27 NOTE — ASSESSMENT
[Carbohydrate Consistent Diet] : carbohydrate consistent diet [Importance of Diet and Exercise] : importance of diet and exercise to improve glycemic control, achieve weight loss and improve cardiovascular health [Exercise/Effect on Glucose] : exercise/effect on glucose [FreeTextEntry1] : 63 y/o F pt with:\par \par 1. T2DM with long duration and history of albuminuria:\par Pt underwent L hip surgery in 10/2021 is less active. Her DM is fairly controlled with POCT A1c 6.9% (H). Pt is complaining of pins and needle sensation in LE for which she was prescribed Lyrica. Continue with Ozempic 0.5 mg qw, Repaglinide 2 mg ac and Metformin 1000 mg bid. Send labs today. Will contact pt with results.\par \par 2. Hypertriglyceridemia related to excess carbohydrate intake:\par Pt was recommended to reduce caloric intake. Continue with Rosuvastatin 40 mg and start ASA 81 mg. \par \par Return in: 2 months. \par \par \par \par \par

## 2022-06-27 NOTE — THERAPY
[Today's Date] : [unfilled] [FreeTextEntry7] : Ozempic 0.5 mg qw, Metformin 1000 mg bid, Repaglinide 2 mg ac, Simvastatin 40 mg QD

## 2022-06-27 NOTE — ADDENDUM
[FreeTextEntry1] : I Simeon Nehemiah act soley as a scribe for Dr. Chaitanya Garza on this date. 06/23/2022

## 2022-06-27 NOTE — REVIEW OF SYSTEMS
[As Noted in HPI] : as noted in HPI [Negative] : Heme/Lymph [Recent Weight Gain (___ Lbs)] : no recent weight gain [Recent Weight Loss (___ Lbs)] : no recent weight loss [Nocturia] : no nocturia

## 2022-06-27 NOTE — END OF VISIT
[FreeTextEntry3] : All medical record entries made by the Scribe were at my, Dr. Chaitanya Garza, direction and personally dictated by me on 06/23/2022. I have reviewed the chart and agree that the record accurately reflects my personal performance of the history, physical exam, assessment and plan. I have also personally directed, reviewed and agreed with the chart.  [Time Spent: ___ minutes] : I have spent [unfilled] minutes of time on the encounter.

## 2022-06-27 NOTE — DATA REVIEWED
[FreeTextEntry1] : Labs: \par - 06/23/22: POCT A1c 6.9% (H)\par - 12/10/21: A1c 5.9% (H),  (H), LDL-c 80, Free T4 0.99, TSH 4.010 (H), s.creat 0.61\par - 04/17/21: serum creatinine 0.57, triglycerides 289,Calcium 9.3, TSH 3.1, A1c 6.3% and uric acid 4.8 \par - 1/27/21: Microalbumin/creatinine ratio 81, LDL 98, Triglyceride 218, HDL 50, Non-, Glucose 75, Free T4 0.9, TSH 3.36, A1c 7.6%\par - 9/24/20: Glucose 308, s.creat 0.9, ALT 49, LDL-c 90, , Alb/Creat nml, TSH 4.41\par - 9/23/20: POCT A1c 8.2%

## 2022-06-27 NOTE — PHYSICAL EXAM
[Alert] : alert [Normal Outer Ear/Nose] : the ears and nose were normal in appearance [No Neck Mass] : no neck mass was observed [No Respiratory Distress] : no respiratory distress [Clear to Auscultation] : lungs were clear to auscultation bilaterally [Normal Rate] : heart rate was normal [Regular Rhythm] : with a regular rhythm [No Edema] : no peripheral edema [Pedal Pulses Normal] : the pedal pulses are present [Normal Bowel Sounds] : normal bowel sounds [Spine Straight] : spine straight [No Stigmata of Cushings Syndrome] : no stigmata of Cushings Syndrome [Normal Gait] : normal gait [Right foot was examined, including] : right foot ~C was examined, including visual inspection with sensory and pulse exams [Left foot was examined, including] : left foot ~C was examined, including visual inspection with sensory and pulse exams [Normal Reflexes] : deep tendon reflexes were 2+ and symmetric [Oriented x3] : oriented to person, place, and time [Acanthosis Nigricans] : no acanthosis nigricans [Vibration Dec.] : normal vibratory sensation at the level of the toes [de-identified] : Cataracts.

## 2022-06-27 NOTE — HISTORY OF PRESENT ILLNESS
[FreeTextEntry1] : 63 y/o F pt, with Hx of T2DM (dx at age 42) with DM related complications including albuminuria, and Hx of subclinical hypothyroidism. \par Other PMHx: RA (has consultation appointment with rheumatologist), Hypertriglyceridemia\par PSHx:  (~25 yrs ago), Cholecystectomy (~15 yrs ago), Uterus (~10 yrs ago), L hip replacement (10/2021; receiving PT for ambulation)\par FHx: Thyroid disorder (3 brothers), DM (2 sisters)\par Lifestyle: Sedentary. Eats 2 meals a day. Checks FBS QD. \par Menopause in . Pt has one son of age 25.\par Last Funduscopic visit: 2022\par \par 2022\par Pt presents today with /74, BMI 26.08, and POCT 175, feeling well and reports no major issues regarding BS levels. Once or twice a week she will have episodes of hypoglycemia. Pt is consistent with Tresiba 13u, Ozempic qw, and Metformin. She recently completed her yearly physical and most recent A1c is 5.2% as per pt. In 10/2021, she was in the hospital for L hip surgery. \par COVID 2nd dose: 2021. Pt will schedule booster dose soon. \par Pt did not receive flu shot.  \par \par 2022\par Pt has POCT A1c 6.9%, POCT 245, /86 and BMI 25.89. No significant weight changes.  \par Today, pt is feeling well, with c/o pins and needle sensation in LE for the last 4 weeks. She leads a sedentary lifestyle since her hip replacement surgery in 10/2021. Pt brought her glucose log: FBS: 178, 142, 153, 131, 164, 147, 147, 109, 138, 158. \par Last ophthalmologist visit was 3 months ago. Denies nocturia and weight loss. \par \par Current Medications: Ozempic 0.5 mg qw (Dced 22; Restarted 2 weeks ago noted 2022), Metformin 1000 mg bid, Repaglinide 2 mg ac (initiated last visit 2020), Rosuvastatin 40 mg QD, Fenofibrate 50 mg, Lyrica, lisinopril 5 mg\par - Held: Tresiba 13 u qpm (initiated [id number]) \par \par Medication modified/added this visit: ASA 81 mg (rx 2022)

## 2022-07-04 LAB
ALBUMIN SERPL ELPH-MCNC: 4.6 G/DL
ALP BLD-CCNC: 54 U/L
ALT SERPL-CCNC: 12 U/L
ANION GAP SERPL CALC-SCNC: 12 MMOL/L
AST SERPL-CCNC: 13 U/L
BILIRUB SERPL-MCNC: 0.3 MG/DL
BUN SERPL-MCNC: 11 MG/DL
CALCIUM SERPL-MCNC: 9.7 MG/DL
CHLORIDE SERPL-SCNC: 101 MMOL/L
CHOLEST SERPL-MCNC: 157 MG/DL
CO2 SERPL-SCNC: 25 MMOL/L
CREAT SERPL-MCNC: 0.56 MG/DL
CREAT SPEC-SCNC: 114 MG/DL
EGFR: 104 ML/MIN/1.73M2
ESTIMATED AVERAGE GLUCOSE: 163 MG/DL
FOLATE SERPL-MCNC: 15.6 NG/ML
GLUCOSE SERPL-MCNC: 162 MG/DL
HBA1C MFR BLD HPLC: 6.9
HBA1C MFR BLD HPLC: 7.3 %
HDLC SERPL-MCNC: 42 MG/DL
LDLC SERPL CALC-MCNC: 73 MG/DL
MICROALBUMIN 24H UR DL<=1MG/L-MCNC: 1.3 MG/DL
MICROALBUMIN/CREAT 24H UR-RTO: 12 MG/G
NONHDLC SERPL-MCNC: 115 MG/DL
POTASSIUM SERPL-SCNC: 4.6 MMOL/L
PROT SERPL-MCNC: 6.9 G/DL
SODIUM SERPL-SCNC: 137 MMOL/L
TRIGL SERPL-MCNC: 211 MG/DL
VIT B12 SERPL-MCNC: >2000 PG/ML

## 2022-08-22 RX ORDER — ROSUVASTATIN CALCIUM 20 MG/1
20 TABLET, FILM COATED ORAL
Qty: 90 | Refills: 2 | Status: DISCONTINUED | COMMUNITY
Start: 2021-01-27 | End: 2022-08-22

## 2022-08-22 RX ORDER — PEN NEEDLE, DIABETIC 29 G X1/2"
32G X 4 MM NEEDLE, DISPOSABLE MISCELLANEOUS
Qty: 50 | Refills: 2 | Status: DISCONTINUED | COMMUNITY
Start: 2020-12-03 | End: 2022-08-22

## 2022-08-23 ENCOUNTER — APPOINTMENT (OUTPATIENT)
Dept: NEUROSURGERY | Facility: CLINIC | Age: 62
End: 2022-08-23

## 2022-08-23 VITALS
HEIGHT: 61 IN | SYSTOLIC BLOOD PRESSURE: 129 MMHG | OXYGEN SATURATION: 99 % | DIASTOLIC BLOOD PRESSURE: 79 MMHG | TEMPERATURE: 97.6 F | BODY MASS INDEX: 24.73 KG/M2 | WEIGHT: 131 LBS | HEART RATE: 73 BPM

## 2022-08-23 DIAGNOSIS — M54.9 DORSALGIA, UNSPECIFIED: ICD-10-CM

## 2022-08-23 PROCEDURE — 99213 OFFICE O/P EST LOW 20 MIN: CPT

## 2022-08-23 RX ORDER — IBUPROFEN 800 MG/1
800 TABLET, FILM COATED ORAL 3 TIMES DAILY
Qty: 90 | Refills: 2 | Status: ACTIVE | COMMUNITY
Start: 2022-08-23 | End: 1900-01-01

## 2022-08-23 RX ORDER — METHOCARBAMOL 750 MG/1
750 TABLET, FILM COATED ORAL 3 TIMES DAILY
Qty: 90 | Refills: 0 | Status: COMPLETED | COMMUNITY
Start: 2022-08-23 | End: 2022-09-22

## 2022-08-23 NOTE — PHYSICAL EXAM
[General Appearance - Alert] : alert [General Appearance - In No Acute Distress] : in no acute distress [Oriented To Time, Place, And Person] : oriented to person, place, and time [Impaired Insight] : insight and judgment were intact [Affect] : the affect was normal [Normal] : normal [Pain / Temp Decrease Sole Of Foot & Posterior Leg Left Only] : S1 sensory impairment [Pain / Temp Decrease Sole Of Foot & Posterior Leg Bilateral] : S1 sensory impairment [5] : 5/5 Ankle Plantar Flexion (S1) [Straight-Leg Raise Test - Left] : straight leg raise of the left leg was negative [Straight-Leg Raise Test - Right] : straight leg raise  of the right leg was negative

## 2022-08-23 NOTE — ASSESSMENT
[FreeTextEntry1] : At this point she can tolerate her symptoms and continues to work. I told her I would continue conservative therapy for now since the symptoms are tolerable.

## 2022-08-23 NOTE — HISTORY OF PRESENT ILLNESS
[FreeTextEntry1] : low back pain  [de-identified] : 08/23/22 She is here for follow up. her symptoms are better. The pain severity is 7/10. She did physical therapy which helped. She can walk as much as she wants. \par \par 6/16/22\par The patient is a 61 y/o, female, with a hx of DM II, HLD, LTHR by  2021, and chronic low back pain. Since undergoing L hip replacement she has been experiencing low back pain radiating into posterior buttocks,L groin/hip, and b/l hamstrings. She rates the pain a 7/10. She reports her knees lock occasionally when walking. Denies any weakness, or incontinence. She has numbness of feet, however has diabetic neuropathy and has been on pregabalin. She has been attending physical therapy for 1 year for her lumbar spine. She has been seen by  pain management for blocks, and RFA. MRI lumbar spine 12/21.

## 2022-09-23 ENCOUNTER — RX RENEWAL (OUTPATIENT)
Age: 62
End: 2022-09-23

## 2022-09-23 RX ORDER — ASPIRIN 81 MG/1
81 TABLET, COATED ORAL
Qty: 30 | Refills: 2 | Status: ACTIVE | COMMUNITY
Start: 2021-11-19 | End: 1900-01-01

## 2022-11-01 ENCOUNTER — APPOINTMENT (OUTPATIENT)
Dept: ENDOCRINOLOGY | Facility: CLINIC | Age: 62
End: 2022-11-01

## 2023-02-01 ENCOUNTER — APPOINTMENT (OUTPATIENT)
Dept: ENDOCRINOLOGY | Facility: CLINIC | Age: 63
End: 2023-02-01
Payer: MEDICAID

## 2023-02-01 VITALS
HEIGHT: 61 IN | HEART RATE: 75 BPM | DIASTOLIC BLOOD PRESSURE: 82 MMHG | BODY MASS INDEX: 25.3 KG/M2 | SYSTOLIC BLOOD PRESSURE: 130 MMHG | WEIGHT: 134 LBS

## 2023-02-01 DIAGNOSIS — E78.1 PURE HYPERGLYCERIDEMIA: ICD-10-CM

## 2023-02-01 PROCEDURE — 99214 OFFICE O/P EST MOD 30 MIN: CPT | Mod: 25

## 2023-02-01 PROCEDURE — 82962 GLUCOSE BLOOD TEST: CPT

## 2023-02-02 NOTE — PAST MEDICAL HISTORY
[Postmenopausal] : The patient is postmenopausal [Menopause Age____] : age at menopause was [unfilled] [Living ___] : Living: [unfilled] [FreeTextEntry5] :  (~)

## 2023-02-02 NOTE — END OF VISIT
[FreeTextEntry3] : All medical record entries made by the Scribe were at my, Dr. Chaitanya Garza, direction and personally dictated by me on 02/01/2023. I have reviewed the chart and agree that the record accurately reflects my personal performance of the history, physical exam, assessment and plan. I have also personally directed, reviewed and agreed with the chart.  [Time Spent: ___ minutes] : I have spent [unfilled] minutes of time on the encounter.

## 2023-02-02 NOTE — ASSESSMENT
[Carbohydrate Consistent Diet] : carbohydrate consistent diet [Importance of Diet and Exercise] : importance of diet and exercise to improve glycemic control, achieve weight loss and improve cardiovascular health [Self Monitoring of Blood Glucose] : self monitoring of blood glucose [FreeTextEntry1] :  \par \par \par \par

## 2023-02-02 NOTE — DATA REVIEWED
[FreeTextEntry1] : Scanned Labs: \par - 12/10/21: A1c 5.9% (H),  (H), LDL-c 80, Free T4 0.99, TSH 4.010 (H), s.creat 0.61\par - 04/17/21: serum creatinine 0.57, triglycerides 289,Calcium 9.3, TSH 3.1, A1c 6.3% and uric acid 4.8 \par - 9/24/20: Glucose 308, s.creat 0.9, ALT 49, LDL-c 90, , Alb/Creat nml, TSH 4.41\par

## 2023-02-02 NOTE — THERAPY
[Today's Date] : [unfilled] [FreeTextEntry7] : Ozempic 0.5 mg qw (Dced 01/04/22; Restarted 2 weeks ago noted 06/23/2022), Metformin 1000 mg bid, Repaglinide 2 mg ac (initiated last visit 12/2020), Simvastatin 40 mg QD

## 2023-02-02 NOTE — HISTORY OF PRESENT ILLNESS
[FreeTextEntry1] : 64 y/o F pt, with Hx of T2DM (dx at age 42) and Hx of subclinical hypothyroidism. \par # T2DM\par DM related complications including albuminuria. \par Pertinent DM FHx: 2 sisters. \par Lifestyle: Sedentary. Eats 2 meals a day. Checks FBS QD. \par Last Funduscopic visit: 2022\par \par # Subclinical hypothyroidism\par FHx: 3 brothers with thyroid disorder. \par \par Other PMHx: RA (has consultation appointment with rheumatologist), Hypertriglyceridemia\par PSHx:  (~), Cholecystectomy (~ ), Uterus (~), L hip replacement (10/2021; receiving PT for ambulation), \par L Hip Surgery (10/2021)\par Menopause in . Pt has one son of age 25.\par Vaccination: COVID 2nd dose: 2021.\par \par 2023\par Pt has POCT 177, /82 and BMI 25.32. She gained 3 lbs in 6 months. \par Today, pt is feeling well, with no physical complaints. Of note, her lancets are not working. Despite this, BS were checked. FBS in target. \par Last ophthalmologist visit was 6 months ago. Pt requests to be seen by rheumatologist. \par  \par [Medications verified as per pt on 2023] \par Current Medications: Ozempic 0.5 mg qw (Dced 22; Restarted 2 weeks ago noted 2022), Metformin 1000 mg bid, Repaglinide 2 mg ac (initiated last visit 2020), Simvastatin 40 mg QD, Lyrica 100 mg, lisinopril 5 mg, Omeprazole, ASA 81 mg (rx 2022) \par - Held: Tresiba 13 u qpm (initiated [id number])\par

## 2023-02-02 NOTE — ADDENDUM
[FreeTextEntry1] : I Noelflaquito Cerna act soley as a scribe for Dr. Chaitanya Garza on this date. 02/01/2023

## 2023-02-02 NOTE — PHYSICAL EXAM
[Alert] : alert [Normal Outer Ear/Nose] : the ears and nose were normal in appearance [No Neck Mass] : no neck mass was observed [No Respiratory Distress] : no respiratory distress [Clear to Auscultation] : lungs were clear to auscultation bilaterally [Normal Rate] : heart rate was normal [Regular Rhythm] : with a regular rhythm [No Edema] : no peripheral edema [Pedal Pulses Normal] : the pedal pulses are present [Normal Bowel Sounds] : normal bowel sounds [Spine Straight] : spine straight [No Stigmata of Cushings Syndrome] : no stigmata of Cushings Syndrome [Normal Gait] : normal gait [Right foot was examined, including] : right foot ~C was examined, including visual inspection with sensory and pulse exams [Left foot was examined, including] : left foot ~C was examined, including visual inspection with sensory and pulse exams [Normal Reflexes] : deep tendon reflexes were 2+ and symmetric [Oriented x3] : oriented to person, place, and time [Acanthosis Nigricans] : no acanthosis nigricans [Vibration Dec.] : normal vibratory sensation at the level of the toes [de-identified] : Cataracts.

## 2023-02-02 NOTE — REVIEW OF SYSTEMS
[As Noted in HPI] : as noted in HPI [Recent Weight Gain (___ Lbs)] : recent weight gain: [unfilled] lbs [Negative] : Neurological [Recent Weight Loss (___ Lbs)] : no recent weight loss [Nocturia] : no nocturia [FreeTextEntry2] : She gained 3 lbs in 6 months.

## 2023-02-10 LAB
ANION GAP SERPL CALC-SCNC: 11 MMOL/L
BUN SERPL-MCNC: 20 MG/DL
CALCIUM SERPL-MCNC: 9.6 MG/DL
CHLORIDE SERPL-SCNC: 106 MMOL/L
CHOLEST SERPL-MCNC: 143 MG/DL
CO2 SERPL-SCNC: 26 MMOL/L
CREAT SERPL-MCNC: 0.64 MG/DL
CREAT SPEC-SCNC: 144 MG/DL
EGFR: 100 ML/MIN/1.73M2
ESTIMATED AVERAGE GLUCOSE: 143 MG/DL
GLUCOSE BLDC GLUCOMTR-MCNC: 177
GLUCOSE SERPL-MCNC: 144 MG/DL
HBA1C MFR BLD HPLC: 6.6 %
HDLC SERPL-MCNC: 42 MG/DL
LDLC SERPL CALC-MCNC: 48 MG/DL
MICROALBUMIN 24H UR DL<=1MG/L-MCNC: 1.6 MG/DL
MICROALBUMIN/CREAT 24H UR-RTO: 11 MG/G
NONHDLC SERPL-MCNC: 101 MG/DL
POTASSIUM SERPL-SCNC: 4.2 MMOL/L
SODIUM SERPL-SCNC: 143 MMOL/L
TRIGL SERPL-MCNC: 266 MG/DL

## 2023-03-11 ENCOUNTER — RX RENEWAL (OUTPATIENT)
Age: 63
End: 2023-03-11

## 2023-03-11 RX ORDER — BLOOD-GLUCOSE METER
EACH MISCELLANEOUS
Qty: 1 | Refills: 0 | Status: ACTIVE | COMMUNITY
Start: 2023-03-11 | End: 1900-01-01

## 2023-07-20 ENCOUNTER — RX RENEWAL (OUTPATIENT)
Age: 63
End: 2023-07-20

## 2023-08-02 ENCOUNTER — LABORATORY RESULT (OUTPATIENT)
Age: 63
End: 2023-08-02

## 2023-08-02 ENCOUNTER — APPOINTMENT (OUTPATIENT)
Dept: ENDOCRINOLOGY | Facility: CLINIC | Age: 63
End: 2023-08-02
Payer: MEDICAID

## 2023-08-02 VITALS
HEART RATE: 68 BPM | WEIGHT: 137 LBS | DIASTOLIC BLOOD PRESSURE: 78 MMHG | BODY MASS INDEX: 25.89 KG/M2 | SYSTOLIC BLOOD PRESSURE: 131 MMHG

## 2023-08-02 PROCEDURE — 82962 GLUCOSE BLOOD TEST: CPT

## 2023-08-02 PROCEDURE — 99214 OFFICE O/P EST MOD 30 MIN: CPT | Mod: 25

## 2023-08-02 NOTE — ASSESSMENT
[Carbohydrate Consistent Diet] : carbohydrate consistent diet [Importance of Diet and Exercise] : importance of diet and exercise to improve glycemic control, achieve weight loss and improve cardiovascular health [Self Monitoring of Blood Glucose] : self monitoring of blood glucose [Diabetic Medications] : Risks and benefits of diabetic medications were discussed

## 2023-08-04 RX ORDER — LISINOPRIL 5 MG/1
5 TABLET ORAL DAILY
Qty: 90 | Refills: 2 | Status: ACTIVE | COMMUNITY
Start: 2022-06-23 | End: 1900-01-01

## 2023-08-04 RX ORDER — REPAGLINIDE 2 MG/1
2 TABLET ORAL 3 TIMES DAILY
Qty: 270 | Refills: 2 | Status: ACTIVE | COMMUNITY
Start: 2020-12-29 | End: 1900-01-01

## 2023-08-04 RX ORDER — ISOPROPYL ALCOHOL 0.7 ML/ML
SWAB TOPICAL
Qty: 3 | Refills: 3 | Status: ACTIVE | COMMUNITY
Start: 2020-12-09 | End: 1900-01-01

## 2023-08-04 NOTE — THERAPY
[Today's Date] : [unfilled] [FreeTextEntry7] : Ozempic 1 mg qw, Metformin 1000 mg bid, Repaglinide 2 mg ac, Simvastatin 40 mg qd, Lyrica 100 mg, ASA 81 mg

## 2023-08-04 NOTE — PHYSICAL EXAM
[Alert] : alert [Normal Outer Ear/Nose] : the ears and nose were normal in appearance [No Neck Mass] : no neck mass was observed [Clear to Auscultation] : lungs were clear to auscultation bilaterally [Normal Rate] : heart rate was normal [Regular Rhythm] : with a regular rhythm [No Edema] : no peripheral edema [Pedal Pulses Normal] : the pedal pulses are present [Normal Bowel Sounds] : normal bowel sounds [Spine Straight] : spine straight [No Stigmata of Cushings Syndrome] : no stigmata of Cushings Syndrome [Normal Gait] : normal gait [Normal Reflexes] : deep tendon reflexes were 2+ and symmetric [Oriented x3] : oriented to person, place, and time [Acanthosis Nigricans] : no acanthosis nigricans [de-identified] : Cataracts.

## 2023-08-04 NOTE — END OF VISIT
[FreeTextEntry3] : All medical record entries made by the Scribe were at my, Dr. Chaitanya Garza, direction and personally dictated by me on 08/02/2023. I have reviewed the chart and agree that the record accurately reflects my personal performance of the history, physical exam, assessment and plan. I have also personally directed, reviewed and agreed with the chart.  [Time Spent: ___ minutes] : I have spent [unfilled] minutes of time on the encounter.

## 2023-08-04 NOTE — REVIEW OF SYSTEMS
[As Noted in HPI] : as noted in HPI [Recent Weight Gain (___ Lbs)] : recent weight gain: [unfilled] lbs [Negative] : Heme/Lymph [FreeTextEntry2] : She gained 6 lbs in 1 year.

## 2023-08-04 NOTE — HISTORY OF PRESENT ILLNESS
[FreeTextEntry1] : 62 y/o F pt, with Hx of T2DM (dx at age 42) and Hx of subclinical hypothyroidism.  # T2DM DM related complications including albuminuria.  Pertinent DM FHx: 2 sisters.  Lifestyle: Sedentary. Eats 2 meals a day. Checks FBS QD.  Last Funduscopic visit: 2022  # Subclinical hypothyroidism FHx: 3 brothers with thyroid disorder.   Other PMHx: RA (has consultation appointment with rheumatologist), Hypertriglyceridemia PSHx:  (~), Cholecystectomy (~ ), Uterus (~), L hip replacement (10/2021; receiving PT for ambulation),  L Hip Surgery (10/2021) Menopause in . Pt has one son of age 25. Vaccination: COVID 2nd dose: 2021.  2023 Pt has POCT 177, /82 and BMI 25.32. She gained 3 lbs in 6 months.  Today, pt is feeling well, with no physical complaints. Of note, her lancets are not working. Despite this, BS were checked. FBS in target.  Last ophthalmologist visit was 6 months ago. Pt requests to be seen by rheumatologist.    2023 Pt has POCT 148, /78 and BMI 25.89. Over the past year she has gained 6 lbs. CC: "Overall I am feeling good: Pt reports "running out" of Ozempic 2 months ago. As per pt, her FBS is lower than 150 (higher than before).  She states has seen podiatrist and ophthalmologist recently and exams were normal.   [Medications verified as per pt on 2023]  Current Medications: Ozempic 0.5 mg qw, Metformin 1000 mg bid, Repaglinide 2 mg ac, Simvastatin 40 mg qd, Lyrica 100 mg, Lisinopril 5 mg, Omeprazole 40 mg, ASA 81 mg - Held: Tresiba 13 u qpm (initiated [id number])  Medication modified/added this visit: Increase Ozempic from 0.5 to 1 mg qw (rx 2023)

## 2023-08-04 NOTE — ADDENDUM
[FreeTextEntry1] : I, Felicitas Elder, act soley as a scribe for Dr. Chaitanya Garza on this date. 08/02/2023

## 2023-09-18 LAB — GLUCOSE BLDC GLUCOMTR-MCNC: 148

## 2024-02-05 LAB
CREAT SPEC-SCNC: 117 MG/DL
ESTIMATED AVERAGE GLUCOSE: 143 MG/DL
HBA1C MFR BLD HPLC: 6.6 %
MICROALBUMIN 24H UR DL<=1MG/L-MCNC: 2.4 MG/DL
MICROALBUMIN/CREAT 24H UR-RTO: 20 MG/G

## 2024-02-06 LAB
ANION GAP SERPL CALC-SCNC: 9 MMOL/L
BUN SERPL-MCNC: 15 MG/DL
CALCIUM SERPL-MCNC: 9.3 MG/DL
CHLORIDE SERPL-SCNC: 103 MMOL/L
CHOLEST SERPL-MCNC: 154 MG/DL
CO2 SERPL-SCNC: 27 MMOL/L
CREAT SERPL-MCNC: 0.69 MG/DL
EGFR: 98 ML/MIN/1.73M2
GLUCOSE SERPL-MCNC: 107 MG/DL
HDLC SERPL-MCNC: 40 MG/DL
LDLC SERPL CALC-MCNC: 87 MG/DL
NONHDLC SERPL-MCNC: 113 MG/DL
POTASSIUM SERPL-SCNC: 4.6 MMOL/L
SODIUM SERPL-SCNC: 138 MMOL/L
TRIGL SERPL-MCNC: 152 MG/DL

## 2024-02-07 ENCOUNTER — APPOINTMENT (OUTPATIENT)
Dept: ENDOCRINOLOGY | Facility: CLINIC | Age: 64
End: 2024-02-07
Payer: MEDICAID

## 2024-02-07 VITALS
DIASTOLIC BLOOD PRESSURE: 75 MMHG | WEIGHT: 134 LBS | HEART RATE: 76 BPM | SYSTOLIC BLOOD PRESSURE: 115 MMHG | BODY MASS INDEX: 25.32 KG/M2

## 2024-02-07 DIAGNOSIS — E11.9 TYPE 2 DIABETES MELLITUS W/OUT COMPLICATIONS: ICD-10-CM

## 2024-02-07 LAB — GLUCOSE BLDC GLUCOMTR-MCNC: 159

## 2024-02-07 PROCEDURE — 99214 OFFICE O/P EST MOD 30 MIN: CPT

## 2024-02-07 RX ORDER — BLOOD SUGAR DIAGNOSTIC
STRIP MISCELLANEOUS
Qty: 50 | Refills: 5 | Status: ACTIVE | COMMUNITY
Start: 2023-02-02 | End: 1900-01-01

## 2024-02-07 RX ORDER — SEMAGLUTIDE 1.34 MG/ML
4 INJECTION, SOLUTION SUBCUTANEOUS
Qty: 6 | Refills: 3 | Status: ACTIVE | COMMUNITY
Start: 2021-02-10 | End: 1900-01-01

## 2024-02-07 NOTE — HISTORY OF PRESENT ILLNESS
[FreeTextEntry1] : 63 y/o F pt, with Hx of T2DM (dx at age 42) and Hx of subclinical hypothyroidism.  # T2DM DM related complications including albuminuria.  Pertinent DM FHx: 2 sisters.  Lifestyle: Sedentary. Eats 2 meals a day. Checks FBS QD.  Last Funduscopic visit: 2022  # Subclinical hypothyroidism FHx: 3 brothers with thyroid disorder.   Other PMHx: RA (has consultation appointment with rheumatologist), Hypertriglyceridemia PSHx:  (~), Cholecystectomy (~ ), Uterus (~), L hip replacement (10/2021; receiving PT for ambulation),  L Hip Surgery (10/2021) Menopause in . Pt has one son of age 25. Vaccination: COVID 2nd dose: 2021.  2023 Pt has POCT 177, /82 and BMI 25.32. She gained 3 lbs in 6 months.  Today, pt is feeling well, with no physical complaints. Of note, her lancets are not working. Despite this, BS were checked. FBS in target.  Last ophthalmologist visit was 6 months ago. Pt requests to be seen by rheumatologist.    2023 Pt has POCT 148, /78 and BMI 25.89. Over the past year she has gained 6 lbs. CC: "Overall I am feeling good: Pt reports "running out" of Ozempic 2 months ago. As per pt, her FBS is lower than 150 (higher than before).  She states has seen podiatrist and ophthalmologist recently and exams were normal.   2024  Pt has POCT 159, /75 and BMI 25.32.  CC: "In general, I feel really good. Pt states that she last followed up with her ophthalmologist 6 months ago and visited her PCP last December. She reports that she is doing well and her FBS ranges from 110-120.  [Medications verified as per pt on 2023]  Current Medications: Ozempic 0.5 mg qw, Metformin 1000 mg bid, Repaglinide 2 mg ac, Simvastatin 40 mg qd, Lyrica 100 mg, Lisinopril 5 mg, Omeprazole 40 mg, ASA 81 mg - Held: Tresiba 13 u qpm (initiated [id number])  Medication modified/added this visit: Increase Ozempic from 0.5 to 1 mg qw (rx 2023)

## 2024-02-07 NOTE — ADDENDUM
[FreeTextEntry1] : I, Johan You act solely as a scribe for Dr. Chaitanya Garza on this date 02/07/2024

## 2024-02-07 NOTE — END OF VISIT
[FreeTextEntry3] :  All medical record entries made by the Scribe were at my, Dr. Chaitanya Garza, direction and personally dictated by me on 02/07/2024. I have reviewed the chart and agree that the record accurately reflects my personal performance of the history, physical exam, assessment and plan. I have also personally directed, reviewed and agreed with the chart. [Time Spent: ___ minutes] : I have spent [unfilled] minutes of time on the encounter.

## 2024-02-07 NOTE — PHYSICAL EXAM
[No Acute Distress] : no acute distress [Normal Outer Ear/Nose] : the ears and nose were normal in appearance [Thyroid Not Enlarged] : the thyroid was not enlarged [No Thyroid Nodules] : no palpable thyroid nodules [Clear to Auscultation] : lungs were clear to auscultation bilaterally [Normal Rate] : heart rate was normal [No Edema] : no peripheral edema [Pedal Pulses Normal] : the pedal pulses are present [Normal Bowel Sounds] : normal bowel sounds [Spine Straight] : spine straight [No Stigmata of Cushings Syndrome] : no stigmata of Cushings Syndrome [Normal Gait] : normal gait [Normal Reflexes] : deep tendon reflexes were 2+ and symmetric [Oriented x3] : oriented to person, place, and time [Acanthosis Nigricans] : no acanthosis nigricans [de-identified] : Cataracts.

## 2024-04-17 ENCOUNTER — NON-APPOINTMENT (OUTPATIENT)
Age: 64
End: 2024-04-17

## 2024-04-17 ENCOUNTER — APPOINTMENT (OUTPATIENT)
Dept: OPHTHALMOLOGY | Facility: CLINIC | Age: 64
End: 2024-04-17
Payer: MEDICAID

## 2024-04-17 PROCEDURE — 92250 FUNDUS PHOTOGRAPHY W/I&R: CPT

## 2024-04-17 PROCEDURE — 92004 COMPRE OPH EXAM NEW PT 1/>: CPT | Mod: 25

## 2024-08-07 ENCOUNTER — APPOINTMENT (OUTPATIENT)
Dept: ENDOCRINOLOGY | Facility: CLINIC | Age: 64
End: 2024-08-07

## 2024-08-08 ENCOUNTER — APPOINTMENT (OUTPATIENT)
Dept: ENDOCRINOLOGY | Facility: CLINIC | Age: 64
End: 2024-08-08

## 2024-08-20 ENCOUNTER — APPOINTMENT (OUTPATIENT)
Dept: RHEUMATOLOGY | Facility: CLINIC | Age: 64
End: 2024-08-20
Payer: MEDICAID

## 2024-08-20 VITALS
BODY MASS INDEX: 24.92 KG/M2 | TEMPERATURE: 97 F | WEIGHT: 132 LBS | DIASTOLIC BLOOD PRESSURE: 71 MMHG | OXYGEN SATURATION: 98 % | RESPIRATION RATE: 15 BRPM | HEIGHT: 61 IN | SYSTOLIC BLOOD PRESSURE: 113 MMHG | HEART RATE: 76 BPM

## 2024-08-20 DIAGNOSIS — Z78.9 OTHER SPECIFIED HEALTH STATUS: ICD-10-CM

## 2024-08-20 DIAGNOSIS — Z82.61 FAMILY HISTORY OF ARTHRITIS: ICD-10-CM

## 2024-08-20 DIAGNOSIS — Z79.899 OTHER LONG TERM (CURRENT) DRUG THERAPY: ICD-10-CM

## 2024-08-20 DIAGNOSIS — M25.50 PAIN IN UNSPECIFIED JOINT: ICD-10-CM

## 2024-08-20 PROCEDURE — 99204 OFFICE O/P NEW MOD 45 MIN: CPT

## 2024-08-20 NOTE — PHYSICAL EXAM
[General Appearance - In No Acute Distress] : in no acute distress [Auscultation Breath Sounds / Voice Sounds] : lungs were clear to auscultation bilaterally [Heart Rate And Rhythm] : heart rate was normal and rhythm regular [Heart Sounds] : normal S1 and S2 [Heart Sounds Gallop] : no gallops [Abdomen Soft] : soft [Abdomen Tenderness] : non-tender [Musculoskeletal - Swelling] : no joint swelling seen [] : no rash [Skin Lesions] : no skin lesions [Oriented To Time, Place, And Person] : oriented to person, place, and time

## 2024-08-20 NOTE — HISTORY OF PRESENT ILLNESS
[FreeTextEntry1] : 63 y/o F here for initial visit  Pt reports hand pain (R>L) in MCPs, PIPs, wrists, that radiates to forearm. Pt reports the symptoms have been present for 3 months. Pt says she has stiffness, swelling. Pt says her symptoms are worse in am. Pt says activities help w pain.  Pt has tried ibuprofen, which does help patient.  Has used wrist brace, which helps patient.   Pt reports arthritis in spine. Has pinched nerves.   No fevers, h/a, rashes, hair loss, oral ulcers, epistaxis, sinusitis,  swollen glands, dry eyes, CP, SOB, cough, vision changes, abdominal pain, GERD, n/v/d, blood in stool or urine, focal weakness, sensory loss,  Raynaud's, weight loss.  +dry mouth   OB: no hx of miscarriage; no hx of preeclampsia

## 2024-08-20 NOTE — ASSESSMENT
[FreeTextEntry1] : 65 y/o F w polyarthralgias =pain in (R>L) in MCPs, PIPs, wrists =stiffness, reported swelling; no swelling on exam =worse in am, improved w activities =dry mouth   Will assess for inflammatory arthritis/AI dz. Will send for serologies. Will obtain imaging from [facility].   Plan -Labs w serologies, inflammatory markers -obtain xrays from [facility] RTO depending on above results  English

## 2024-08-30 DIAGNOSIS — M67.80 OTHER SPECIFIED DISORDERS OF SYNOVIUM AND TENDON, UNSPECIFIED SITE: ICD-10-CM

## 2024-08-30 LAB
ALBUMIN SERPL ELPH-MCNC: 4.4 G/DL
ALP BLD-CCNC: 51 U/L
ALT SERPL-CCNC: 16 U/L
ANA SER IF-ACNC: NEGATIVE
ANION GAP SERPL CALC-SCNC: 12 MMOL/L
APPEARANCE: CLEAR
AST SERPL-CCNC: 14 U/L
BACTERIA: NEGATIVE /HPF
BASOPHILS # BLD AUTO: 0.04 K/UL
BASOPHILS NFR BLD AUTO: 0.8 %
BILIRUB SERPL-MCNC: 0.3 MG/DL
BILIRUBIN URINE: NEGATIVE
BLOOD URINE: NEGATIVE
BUN SERPL-MCNC: 18 MG/DL
C3 SERPL-MCNC: 113 MG/DL
C4 SERPL-MCNC: 26 MG/DL
CALCIUM SERPL-MCNC: 9.4 MG/DL
CAST: 2 /LPF
CCP AB SER IA-ACNC: 11.3 U/ML
CHLORIDE SERPL-SCNC: 107 MMOL/L
CK SERPL-CCNC: 136 U/L
CO2 SERPL-SCNC: 22 MMOL/L
COLOR: YELLOW
CREAT SERPL-MCNC: 0.64 MG/DL
CREAT SPEC-SCNC: 159 MG/DL
CREAT/PROT UR: 0.2 RATIO
CRP SERPL-MCNC: <3 MG/L
DSDNA AB SER-ACNC: <1 IU/ML
EGFR: 100 ML/MIN/1.73M2
ENA RNP AB SER IA-ACNC: 0.6 AL
ENA SM AB SER IA-ACNC: <0.2 AL
ENA SS-A AB SER IA-ACNC: <0.2 AL
ENA SS-B AB SER IA-ACNC: <0.2 AL
EOSINOPHIL # BLD AUTO: 0.14 K/UL
EOSINOPHIL NFR BLD AUTO: 2.7 %
EPITHELIAL CELLS: 3 /HPF
ERYTHROCYTE [SEDIMENTATION RATE] IN BLOOD BY WESTERGREN METHOD: 2 MM/HR
G6PD SER-CCNC: 14.7 U/G HGB
GLUCOSE QUALITATIVE U: NEGATIVE MG/DL
GLUCOSE SERPL-MCNC: 199 MG/DL
HBV CORE IGG+IGM SER QL: NONREACTIVE
HBV SURFACE AB SER QL: ABNORMAL
HBV SURFACE AG SER QL: NONREACTIVE
HCT VFR BLD CALC: 37.6 %
HCV AB SER QL: NONREACTIVE
HCV S/CO RATIO: 0.08 S/CO
HGB BLD-MCNC: 11.9 G/DL
IMM GRANULOCYTES NFR BLD AUTO: 0.2 %
KETONES URINE: NEGATIVE MG/DL
LEUKOCYTE ESTERASE URINE: NEGATIVE
LYMPHOCYTES # BLD AUTO: 1.74 K/UL
LYMPHOCYTES NFR BLD AUTO: 33.8 %
M TB IFN-G BLD-IMP: NEGATIVE
MAN DIFF?: NORMAL
MCHC RBC-ENTMCNC: 27 PG
MCHC RBC-ENTMCNC: 31.6 GM/DL
MCV RBC AUTO: 85.3 FL
MICROSCOPIC-UA: NORMAL
MONOCYTES # BLD AUTO: 0.37 K/UL
MONOCYTES NFR BLD AUTO: 7.2 %
NEUTROPHILS # BLD AUTO: 2.85 K/UL
NEUTROPHILS NFR BLD AUTO: 55.3 %
NITRITE URINE: NEGATIVE
PH URINE: 5.5
PLATELET # BLD AUTO: 218 K/UL
POTASSIUM SERPL-SCNC: 4.3 MMOL/L
PROT SERPL-MCNC: 6.4 G/DL
PROT UR-MCNC: 25 MG/DL
PROTEIN URINE: NEGATIVE MG/DL
QUANTIFERON TB PLUS MITOGEN MINUS NIL: >10 IU/ML
QUANTIFERON TB PLUS NIL: 0.06 IU/ML
QUANTIFERON TB PLUS TB1 MINUS NIL: 0 IU/ML
QUANTIFERON TB PLUS TB2 MINUS NIL: 0 IU/ML
RBC # BLD: 4.41 M/UL
RBC # FLD: 14 %
RED BLOOD CELLS URINE: 1 /HPF
RF+CCP IGG SER-IMP: NEGATIVE
RHEUMATOID FACT SER QL: 20 IU/ML
SODIUM SERPL-SCNC: 142 MMOL/L
SPECIFIC GRAVITY URINE: 1.03
UROBILINOGEN URINE: 0.2 MG/DL
WBC # FLD AUTO: 5.15 K/UL
WHITE BLOOD CELLS URINE: 0 /HPF

## 2024-09-30 ENCOUNTER — APPOINTMENT (OUTPATIENT)
Dept: ENDOCRINOLOGY | Facility: CLINIC | Age: 64
End: 2024-09-30
Payer: MEDICAID

## 2024-09-30 VITALS
BODY MASS INDEX: 24.19 KG/M2 | DIASTOLIC BLOOD PRESSURE: 83 MMHG | SYSTOLIC BLOOD PRESSURE: 133 MMHG | HEART RATE: 72 BPM | WEIGHT: 128 LBS

## 2024-09-30 DIAGNOSIS — M79.641 PAIN IN RIGHT HAND: ICD-10-CM

## 2024-09-30 DIAGNOSIS — E11.9 TYPE 2 DIABETES MELLITUS W/OUT COMPLICATIONS: ICD-10-CM

## 2024-09-30 LAB — GLUCOSE BLDC GLUCOMTR-MCNC: 95

## 2024-09-30 PROCEDURE — 99213 OFFICE O/P EST LOW 20 MIN: CPT | Mod: 25

## 2024-09-30 PROCEDURE — 82962 GLUCOSE BLOOD TEST: CPT

## 2024-10-01 NOTE — HISTORY OF PRESENT ILLNESS
[FreeTextEntry1] : 65 y/o F pt, with Hx of T2DM (dx at age 42) and Hx of subclinical hypothyroidism.  # T2DM DM related complications including albuminuria.  Pertinent DM FHx: 2 sisters.  Lifestyle: Sedentary. Eats 2 meals a day. Checks FBS QD.  Last Funduscopic visit: 2022  # Subclinical hypothyroidism FHx: 3 brothers with thyroid disorder.   Other PMHx: RA (has consultation appointment with rheumatologist), Hypertriglyceridemia PSHx:  (~), Cholecystectomy (~ ), Uterus (~), L hip replacement (10/2021; receiving PT for ambulation),  L Hip Surgery (10/2021) Menopause in . Pt has one son of age 25. Vaccination: COVID 2nd dose: 2021.  2023 Pt has POCT 177, /82 and BMI 25.32. She gained 3 lbs in 6 months.  Today, pt is feeling well, with no physical complaints. Of note, her lancets are not working. Despite this, BS were checked. FBS in target.  Last ophthalmologist visit was 6 months ago. Pt requests to be seen by rheumatologist.    2023 Pt has POCT 148, /78 and BMI 25.89. Over the past year she has gained 6 lbs. CC: "Overall I am feeling good: Pt reports "running out" of Ozempic 2 months ago. As per pt, her FBS is lower than 150 (higher than before).  She states has seen podiatrist and ophthalmologist recently and exams were normal.   2024  Pt has POCT 159, /75 and BMI 25.32.  CC: "In general, I feel really good. Pt states that she last followed up with her ophthalmologist 6 months ago and visited her PCP last December. She reports that she is doing well and her FBS ranges from 110-120.  2024  Pt has POCT 95, /83 and BMI 24.19. She lost 6 lbs in 7 months. CC: "I'm not sure why my sugar is rising." Pt reports that [place] was [place] and has been walking less. Her glucose fluctuates and average high 100s. Pt followed up with her podiatrist earlier today. Pt endorses right hand pain. - 24 ophthalmologist Dr. Juan Echols noted no retinopathy. Pt is currently Metformin 1 g bid and Ozempic 1 mg qw (not taking Repaglinide.   [Medications verified as per pt on 2024] Current Medications: Ozempic 1 mg qw (increased from 0.5 mg qw), Metformin 1000 mg bid, Repaglinide 2 mg ac, Simvastatin 40 mg qd, Lyrica 100 mg, Lisinopril 5 mg, Omeprazole 40 mg, ASA 81 mg - Held: Tresiba 13 u qpm (initiated [id number]) Medication modified/added this visit:

## 2024-10-01 NOTE — ADDENDUM
[FreeTextEntry1] : I, Johan You act solely as a scribe for Dr. Chaitanya Garza on this date 09/30/2024

## 2024-10-01 NOTE — PHYSICAL EXAM
[No Acute Distress] : no acute distress [Normal Outer Ear/Nose] : the ears and nose were normal in appearance [Thyroid Not Enlarged] : the thyroid was not enlarged [No Thyroid Nodules] : no palpable thyroid nodules [Clear to Auscultation] : lungs were clear to auscultation bilaterally [Normal Rate] : heart rate was normal [No Edema] : no peripheral edema [Pedal Pulses Normal] : the pedal pulses are present [Normal Bowel Sounds] : normal bowel sounds [Spine Straight] : spine straight [No Stigmata of Cushings Syndrome] : no stigmata of Cushings Syndrome [Normal Gait] : normal gait [Normal Reflexes] : deep tendon reflexes were 2+ and symmetric [Oriented x3] : oriented to person, place, and time [Acanthosis Nigricans] : no acanthosis nigricans [de-identified] : Cataracts.

## 2024-10-01 NOTE — HISTORY OF PRESENT ILLNESS
[FreeTextEntry1] : 65 y/o F pt, with Hx of T2DM (dx at age 42) and Hx of subclinical hypothyroidism.  # T2DM DM related complications including albuminuria.  Pertinent DM FHx: 2 sisters.  Lifestyle: Sedentary. Eats 2 meals a day. Checks FBS QD.  Last Funduscopic visit: 2022  # Subclinical hypothyroidism FHx: 3 brothers with thyroid disorder.   Other PMHx: RA (has consultation appointment with rheumatologist), Hypertriglyceridemia PSHx:  (~), Cholecystectomy (~ ), Uterus (~), L hip replacement (10/2021; receiving PT for ambulation),  L Hip Surgery (10/2021) Menopause in . Pt has one son of age 25. Vaccination: COVID 2nd dose: 2021.  2023 Pt has POCT 177, /82 and BMI 25.32. She gained 3 lbs in 6 months.  Today, pt is feeling well, with no physical complaints. Of note, her lancets are not working. Despite this, BS were checked. FBS in target.  Last ophthalmologist visit was 6 months ago. Pt requests to be seen by rheumatologist.    2023 Pt has POCT 148, /78 and BMI 25.89. Over the past year she has gained 6 lbs. CC: "Overall I am feeling good: Pt reports "running out" of Ozempic 2 months ago. As per pt, her FBS is lower than 150 (higher than before).  She states has seen podiatrist and ophthalmologist recently and exams were normal.   2024  Pt has POCT 159, /75 and BMI 25.32.  CC: "In general, I feel really good. Pt states that she last followed up with her ophthalmologist 6 months ago and visited her PCP last December. She reports that she is doing well and her FBS ranges from 110-120.  2024  Pt has POCT 95, /83 and BMI 24.19. She lost 6 lbs in 7 months. CC: "I'm not sure why my sugar is rising." Pt reports that she was out of the country in 2024 and has been walking less. Her glucose fluctuates and average high 100s. Pt followed up with her podiatrist earlier today. Pt endorses right hand pain. - 24 ophthalmologist Dr. Juan Echols noted no retinopathy. Pt is currently Metformin 1 g bid and Ozempic 1 mg qw (not taking Repaglinide.   [Medications verified as per pt on 2024] Current Medications: Ozempic 1 mg qw (increased from 0.5 mg qw), Metformin 1000 mg bid, Repaglinide 2 mg ac, Simvastatin 40 mg qd, Lyrica 100 mg, Lisinopril 5 mg, Omeprazole 40 mg, ASA 81 mg - Held: Tresiba 13 u qpm (initiated [id number]) Medication modified/added this visit:

## 2024-10-01 NOTE — END OF VISIT
[FreeTextEntry3] :  All medical record entries made by the Scribe were at my, Dr. Chaitanya Garza, direction and personally dictated by me on 09/30/2024. I have reviewed the chart and agree that the record accurately reflects my personal performance of the history, physical exam, assessment and plan. I have also personally directed, reviewed and agreed with the chart. [Time Spent: ___ minutes] : I have spent [unfilled] minutes of time on the encounter which excludes teaching and separately reported services.

## 2024-10-01 NOTE — PHYSICAL EXAM
[No Acute Distress] : no acute distress [Normal Outer Ear/Nose] : the ears and nose were normal in appearance [Thyroid Not Enlarged] : the thyroid was not enlarged [No Thyroid Nodules] : no palpable thyroid nodules [Clear to Auscultation] : lungs were clear to auscultation bilaterally [Normal Rate] : heart rate was normal [No Edema] : no peripheral edema [Pedal Pulses Normal] : the pedal pulses are present [Normal Bowel Sounds] : normal bowel sounds [Spine Straight] : spine straight [No Stigmata of Cushings Syndrome] : no stigmata of Cushings Syndrome [Normal Gait] : normal gait [Normal Reflexes] : deep tendon reflexes were 2+ and symmetric [Oriented x3] : oriented to person, place, and time [Acanthosis Nigricans] : no acanthosis nigricans [de-identified] : Cataracts.

## 2025-03-13 DIAGNOSIS — E11.65 TYPE 2 DIABETES MELLITUS WITH HYPERGLYCEMIA: ICD-10-CM

## 2025-03-25 ENCOUNTER — LABORATORY RESULT (OUTPATIENT)
Age: 65
End: 2025-03-25

## 2025-03-26 ENCOUNTER — APPOINTMENT (OUTPATIENT)
Dept: ENDOCRINOLOGY | Facility: CLINIC | Age: 65
End: 2025-03-26
Payer: MEDICAID

## 2025-03-26 ENCOUNTER — APPOINTMENT (OUTPATIENT)
Dept: ENDOCRINOLOGY | Facility: CLINIC | Age: 65
End: 2025-03-26

## 2025-03-26 VITALS
DIASTOLIC BLOOD PRESSURE: 70 MMHG | WEIGHT: 132 LBS | HEART RATE: 72 BPM | SYSTOLIC BLOOD PRESSURE: 110 MMHG | BODY MASS INDEX: 24.94 KG/M2

## 2025-03-26 DIAGNOSIS — E11.65 TYPE 2 DIABETES MELLITUS WITH HYPERGLYCEMIA: ICD-10-CM

## 2025-03-26 LAB
GLUCOSE BLDC GLUCOMTR-MCNC: 184
HBA1C MFR BLD HPLC: 7.8

## 2025-03-26 PROCEDURE — 82962 GLUCOSE BLOOD TEST: CPT

## 2025-03-26 PROCEDURE — 99214 OFFICE O/P EST MOD 30 MIN: CPT | Mod: 25

## 2025-03-26 PROCEDURE — 83036 HEMOGLOBIN GLYCOSYLATED A1C: CPT | Mod: QW

## 2025-07-04 ENCOUNTER — RX RENEWAL (OUTPATIENT)
Age: 65
End: 2025-07-04

## 2025-07-09 ENCOUNTER — NON-APPOINTMENT (OUTPATIENT)
Age: 65
End: 2025-07-09

## 2025-07-09 ENCOUNTER — APPOINTMENT (OUTPATIENT)
Dept: OPHTHALMOLOGY | Facility: CLINIC | Age: 65
End: 2025-07-09
Payer: MEDICAID

## 2025-07-09 ENCOUNTER — RX RENEWAL (OUTPATIENT)
Age: 65
End: 2025-07-09

## 2025-07-09 PROCEDURE — 92014 COMPRE OPH EXAM EST PT 1/>: CPT | Mod: 25

## 2025-07-09 PROCEDURE — 92133 CPTRZD OPH DX IMG PST SGM ON: CPT

## 2025-07-16 ENCOUNTER — LABORATORY RESULT (OUTPATIENT)
Age: 65
End: 2025-07-16

## 2025-07-30 ENCOUNTER — APPOINTMENT (OUTPATIENT)
Dept: ENDOCRINOLOGY | Facility: CLINIC | Age: 65
End: 2025-07-30
Payer: MEDICAID

## 2025-07-30 ENCOUNTER — NON-APPOINTMENT (OUTPATIENT)
Age: 65
End: 2025-07-30

## 2025-07-30 VITALS
HEART RATE: 74 BPM | SYSTOLIC BLOOD PRESSURE: 130 MMHG | BODY MASS INDEX: 25.32 KG/M2 | DIASTOLIC BLOOD PRESSURE: 78 MMHG | WEIGHT: 134 LBS

## 2025-07-30 DIAGNOSIS — E11.65 TYPE 2 DIABETES MELLITUS WITH HYPERGLYCEMIA: ICD-10-CM

## 2025-07-30 LAB — GLUCOSE BLDC GLUCOMTR-MCNC: 96

## 2025-07-30 PROCEDURE — 99214 OFFICE O/P EST MOD 30 MIN: CPT

## 2025-07-30 PROCEDURE — 82962 GLUCOSE BLOOD TEST: CPT

## 2025-07-30 RX ORDER — BLOOD SUGAR DIAGNOSTIC
STRIP MISCELLANEOUS
Qty: 1 | Refills: 3 | Status: ACTIVE | COMMUNITY
Start: 2025-07-30 | End: 1900-01-01